# Patient Record
Sex: MALE | Race: WHITE | Employment: OTHER | URBAN - NONMETROPOLITAN AREA
[De-identification: names, ages, dates, MRNs, and addresses within clinical notes are randomized per-mention and may not be internally consistent; named-entity substitution may affect disease eponyms.]

---

## 2017-01-31 ENCOUNTER — TELEPHONE (OUTPATIENT)
Dept: PULMONOLOGY | Age: 52
End: 2017-01-31

## 2017-02-08 ENCOUNTER — TELEPHONE (OUTPATIENT)
Dept: CARDIOLOGY | Age: 52
End: 2017-02-08

## 2017-03-07 ENCOUNTER — PROCEDURE VISIT (OUTPATIENT)
Dept: CARDIOLOGY | Age: 52
End: 2017-03-07

## 2017-03-07 ENCOUNTER — OFFICE VISIT (OUTPATIENT)
Dept: CARDIOLOGY | Age: 52
End: 2017-03-07

## 2017-03-07 VITALS
HEART RATE: 88 BPM | WEIGHT: 160 LBS | HEIGHT: 68 IN | SYSTOLIC BLOOD PRESSURE: 116 MMHG | BODY MASS INDEX: 24.25 KG/M2 | DIASTOLIC BLOOD PRESSURE: 78 MMHG

## 2017-03-07 DIAGNOSIS — I50.22 CHF NYHA CLASS II, CHRONIC, SYSTOLIC (HCC): ICD-10-CM

## 2017-03-07 DIAGNOSIS — I50.22 CHRONIC SYSTOLIC CONGESTIVE HEART FAILURE (HCC): ICD-10-CM

## 2017-03-07 DIAGNOSIS — I51.9 LV DYSFUNCTION: ICD-10-CM

## 2017-03-07 DIAGNOSIS — I25.10 CORONARY ARTERY DISEASE INVOLVING NATIVE CORONARY ARTERY OF NATIVE HEART WITHOUT ANGINA PECTORIS: ICD-10-CM

## 2017-03-07 DIAGNOSIS — Q24.5 CORONARY ARTERY ANOMALY: ICD-10-CM

## 2017-03-07 DIAGNOSIS — I10 ESSENTIAL HYPERTENSION: ICD-10-CM

## 2017-03-07 DIAGNOSIS — R42 DIZZINESS AND GIDDINESS: ICD-10-CM

## 2017-03-07 DIAGNOSIS — Z95.810 ICD (IMPLANTABLE CARDIOVERTER-DEFIBRILLATOR) IN PLACE: Primary | ICD-10-CM

## 2017-03-07 DIAGNOSIS — R06.02 SHORTNESS OF BREATH: Primary | ICD-10-CM

## 2017-03-07 PROCEDURE — 99204 OFFICE O/P NEW MOD 45 MIN: CPT | Performed by: INTERNAL MEDICINE

## 2017-03-07 PROCEDURE — 93284 PRGRMG EVAL IMPLANTABLE DFB: CPT | Performed by: INTERNAL MEDICINE

## 2017-03-07 RX ORDER — LOSARTAN POTASSIUM 25 MG/1
25 TABLET ORAL DAILY
Qty: 30 TABLET | Refills: 0 | Status: SHIPPED | OUTPATIENT
Start: 2017-03-07 | End: 2017-04-08 | Stop reason: SDUPTHER

## 2017-03-07 RX ORDER — AMLODIPINE BESYLATE 5 MG/1
5 TABLET ORAL DAILY
Qty: 30 TABLET | Refills: 1 | Status: SHIPPED | OUTPATIENT
Start: 2017-03-07 | End: 2017-04-08 | Stop reason: SDUPTHER

## 2017-03-07 RX ORDER — SIMVASTATIN 20 MG
TABLET ORAL
Qty: 30 TABLET | Refills: 1 | Status: SHIPPED | OUTPATIENT
Start: 2017-03-07 | End: 2017-04-08 | Stop reason: SDUPTHER

## 2017-03-07 RX ORDER — CLONIDINE HYDROCHLORIDE 0.1 MG/1
0.1 TABLET ORAL 2 TIMES DAILY PRN
Qty: 60 TABLET | Refills: 1 | Status: SHIPPED | OUTPATIENT
Start: 2017-03-07 | End: 2017-04-08 | Stop reason: SDUPTHER

## 2017-03-07 RX ORDER — CARVEDILOL 25 MG/1
37.5 TABLET ORAL 2 TIMES DAILY WITH MEALS
Qty: 90 TABLET | Refills: 1 | Status: SHIPPED | OUTPATIENT
Start: 2017-03-07 | End: 2017-04-08 | Stop reason: SDUPTHER

## 2017-03-07 ASSESSMENT — ENCOUNTER SYMPTOMS
VOMITING: 0
ABDOMINAL PAIN: 0
SHORTNESS OF BREATH: 1
SORE THROAT: 0
LEFT EYE: 0
NAUSEA: 0
COUGH: 0
RIGHT EYE: 0
BACK PAIN: 0
DIARRHEA: 0
BLURRED VISION: 0
WHEEZING: 0
DOUBLE VISION: 0
CONSTIPATION: 0

## 2017-04-08 DIAGNOSIS — I50.22 CHF NYHA CLASS II, CHRONIC, SYSTOLIC (HCC): ICD-10-CM

## 2017-04-08 DIAGNOSIS — I51.9 LV DYSFUNCTION: ICD-10-CM

## 2017-04-08 DIAGNOSIS — I25.10 CORONARY ARTERY DISEASE INVOLVING NATIVE CORONARY ARTERY OF NATIVE HEART WITHOUT ANGINA PECTORIS: ICD-10-CM

## 2017-04-08 DIAGNOSIS — I10 ESSENTIAL HYPERTENSION: ICD-10-CM

## 2017-04-08 RX ORDER — GLIPIZIDE 5 MG/1
5 TABLET ORAL
Qty: 60 TABLET | Status: CANCELLED | OUTPATIENT
Start: 2017-04-08

## 2017-04-10 RX ORDER — SIMVASTATIN 20 MG
TABLET ORAL
Qty: 30 TABLET | Refills: 1 | Status: SHIPPED | OUTPATIENT
Start: 2017-04-10 | End: 2017-05-16 | Stop reason: SDUPTHER

## 2017-04-10 RX ORDER — AMLODIPINE BESYLATE 5 MG/1
5 TABLET ORAL DAILY
Qty: 30 TABLET | Refills: 1 | Status: SHIPPED | OUTPATIENT
Start: 2017-04-10 | End: 2017-05-16 | Stop reason: SDUPTHER

## 2017-04-10 RX ORDER — CLONIDINE HYDROCHLORIDE 0.1 MG/1
0.1 TABLET ORAL 2 TIMES DAILY PRN
Qty: 60 TABLET | Refills: 1 | Status: SHIPPED | OUTPATIENT
Start: 2017-04-10 | End: 2017-05-16 | Stop reason: SDUPTHER

## 2017-04-10 RX ORDER — CARVEDILOL 25 MG/1
37.5 TABLET ORAL 2 TIMES DAILY WITH MEALS
Qty: 90 TABLET | Refills: 1 | Status: SHIPPED | OUTPATIENT
Start: 2017-04-10 | End: 2017-05-16 | Stop reason: SDUPTHER

## 2017-04-10 RX ORDER — CLOPIDOGREL BISULFATE 75 MG/1
75 TABLET ORAL DAILY
Qty: 30 TABLET | Refills: 1 | Status: SHIPPED | OUTPATIENT
Start: 2017-04-10 | End: 2017-06-07 | Stop reason: SDUPTHER

## 2017-04-10 RX ORDER — ASPIRIN 81 MG/1
81 TABLET, CHEWABLE ORAL DAILY
Qty: 30 TABLET | Refills: 1 | Status: SHIPPED | OUTPATIENT
Start: 2017-04-10 | End: 2017-06-07 | Stop reason: SDUPTHER

## 2017-04-10 RX ORDER — LOSARTAN POTASSIUM 25 MG/1
25 TABLET ORAL DAILY
Qty: 30 TABLET | Refills: 1 | Status: SHIPPED | OUTPATIENT
Start: 2017-04-10 | End: 2017-05-30 | Stop reason: SDUPTHER

## 2017-05-15 PROBLEM — I50.22 CHRONIC SYSTOLIC HEART FAILURE (HCC): Chronic | Status: ACTIVE | Noted: 2017-05-15

## 2017-05-16 DIAGNOSIS — I10 ESSENTIAL HYPERTENSION: ICD-10-CM

## 2017-05-16 DIAGNOSIS — I50.22 CHF NYHA CLASS II, CHRONIC, SYSTOLIC (HCC): ICD-10-CM

## 2017-05-16 DIAGNOSIS — I51.9 LV DYSFUNCTION: ICD-10-CM

## 2017-05-16 DIAGNOSIS — I25.10 CORONARY ARTERY DISEASE INVOLVING NATIVE CORONARY ARTERY OF NATIVE HEART WITHOUT ANGINA PECTORIS: ICD-10-CM

## 2017-05-16 RX ORDER — AMLODIPINE BESYLATE 5 MG/1
TABLET ORAL
Qty: 30 TABLET | Refills: 0 | Status: SHIPPED | OUTPATIENT
Start: 2017-05-16 | End: 2017-06-07 | Stop reason: SDUPTHER

## 2017-05-16 RX ORDER — CLONIDINE HYDROCHLORIDE 0.1 MG/1
TABLET ORAL
Qty: 60 TABLET | Refills: 0 | Status: SHIPPED | OUTPATIENT
Start: 2017-05-16 | End: 2017-06-07 | Stop reason: SDUPTHER

## 2017-05-16 RX ORDER — CARVEDILOL 25 MG/1
TABLET ORAL
Qty: 45 TABLET | Refills: 0 | Status: SHIPPED | OUTPATIENT
Start: 2017-05-16 | End: 2017-06-07 | Stop reason: SDUPTHER

## 2017-05-16 RX ORDER — SIMVASTATIN 20 MG
TABLET ORAL
Qty: 30 TABLET | Refills: 0 | Status: SHIPPED | OUTPATIENT
Start: 2017-05-16 | End: 2017-06-07 | Stop reason: SDUPTHER

## 2017-05-18 ENCOUNTER — OFFICE VISIT (OUTPATIENT)
Dept: PULMONOLOGY | Age: 52
End: 2017-05-18

## 2017-05-18 VITALS
HEIGHT: 68 IN | OXYGEN SATURATION: 96 % | SYSTOLIC BLOOD PRESSURE: 107 MMHG | WEIGHT: 165.8 LBS | HEART RATE: 83 BPM | DIASTOLIC BLOOD PRESSURE: 64 MMHG | BODY MASS INDEX: 25.13 KG/M2 | TEMPERATURE: 97.1 F

## 2017-05-18 DIAGNOSIS — J96.11 CHRONIC RESPIRATORY FAILURE WITH HYPOXIA (HCC): Primary | ICD-10-CM

## 2017-05-18 DIAGNOSIS — G47.33 OSA (OBSTRUCTIVE SLEEP APNEA): ICD-10-CM

## 2017-05-18 DIAGNOSIS — J44.9 COPD, SEVERE (HCC): ICD-10-CM

## 2017-05-18 DIAGNOSIS — I50.22 CHRONIC SYSTOLIC CONGESTIVE HEART FAILURE (HCC): ICD-10-CM

## 2017-05-18 PROCEDURE — 94620 6 MIN WALK TEST: CPT | Performed by: INTERNAL MEDICINE

## 2017-05-18 PROCEDURE — 99204 OFFICE O/P NEW MOD 45 MIN: CPT | Performed by: INTERNAL MEDICINE

## 2017-05-18 RX ORDER — ALBUTEROL SULFATE 2.5 MG/3ML
2.5 SOLUTION RESPIRATORY (INHALATION) EVERY 6 HOURS PRN
COMMUNITY
End: 2017-08-07 | Stop reason: SDUPTHER

## 2017-05-18 RX ORDER — BUDESONIDE AND FORMOTEROL FUMARATE DIHYDRATE 160; 4.5 UG/1; UG/1
2 AEROSOL RESPIRATORY (INHALATION) 2 TIMES DAILY
Qty: 3 INHALER | Refills: 3 | Status: SHIPPED | OUTPATIENT
Start: 2017-05-18

## 2017-05-18 ASSESSMENT — ENCOUNTER SYMPTOMS
APNEA: 0
CHEST TIGHTNESS: 1
SHORTNESS OF BREATH: 1
ABDOMINAL DISTENTION: 1
COUGH: 1
SINUS PRESSURE: 0
FACIAL SWELLING: 0
WHEEZING: 1
BACK PAIN: 1
PHOTOPHOBIA: 0
BLOOD IN STOOL: 0
VOMITING: 0
RHINORRHEA: 0
ABDOMINAL PAIN: 1
CHOKING: 0
CONSTIPATION: 0
VOICE CHANGE: 0
STRIDOR: 0

## 2017-05-30 DIAGNOSIS — I50.22 CHF NYHA CLASS II, CHRONIC, SYSTOLIC (HCC): ICD-10-CM

## 2017-05-30 RX ORDER — LOSARTAN POTASSIUM 25 MG/1
TABLET ORAL
Qty: 30 TABLET | Refills: 1 | Status: SHIPPED | OUTPATIENT
Start: 2017-05-30 | End: 2017-06-07 | Stop reason: SDUPTHER

## 2017-06-05 ENCOUNTER — OFFICE VISIT (OUTPATIENT)
Dept: NEPHROLOGY | Age: 52
End: 2017-06-05

## 2017-06-05 VITALS
BODY MASS INDEX: 24.78 KG/M2 | WEIGHT: 163 LBS | DIASTOLIC BLOOD PRESSURE: 60 MMHG | RESPIRATION RATE: 16 BRPM | HEART RATE: 68 BPM | SYSTOLIC BLOOD PRESSURE: 102 MMHG

## 2017-06-05 DIAGNOSIS — E08.22 DIABETES MELLITUS DUE TO UNDERLYING CONDITION WITH STAGE 3 CHRONIC KIDNEY DISEASE, UNSPECIFIED LONG TERM INSULIN USE STATUS: ICD-10-CM

## 2017-06-05 DIAGNOSIS — N18.3 DIABETES MELLITUS DUE TO UNDERLYING CONDITION WITH STAGE 3 CHRONIC KIDNEY DISEASE, UNSPECIFIED LONG TERM INSULIN USE STATUS: ICD-10-CM

## 2017-06-05 DIAGNOSIS — I10 ESSENTIAL HYPERTENSION: ICD-10-CM

## 2017-06-05 DIAGNOSIS — N18.30 CKD (CHRONIC KIDNEY DISEASE) STAGE 3, GFR 30-59 ML/MIN (HCC): Primary | ICD-10-CM

## 2017-06-05 PROCEDURE — 99204 OFFICE O/P NEW MOD 45 MIN: CPT | Performed by: INTERNAL MEDICINE

## 2017-06-07 ENCOUNTER — OFFICE VISIT (OUTPATIENT)
Dept: CARDIOLOGY | Age: 52
End: 2017-06-07

## 2017-06-07 VITALS
BODY MASS INDEX: 25.46 KG/M2 | WEIGHT: 168 LBS | HEART RATE: 76 BPM | SYSTOLIC BLOOD PRESSURE: 120 MMHG | HEIGHT: 68 IN | DIASTOLIC BLOOD PRESSURE: 58 MMHG

## 2017-06-07 DIAGNOSIS — I10 ESSENTIAL HYPERTENSION: ICD-10-CM

## 2017-06-07 DIAGNOSIS — R06.02 SOB (SHORTNESS OF BREATH): Primary | ICD-10-CM

## 2017-06-07 DIAGNOSIS — I25.10 CORONARY ARTERY DISEASE INVOLVING NATIVE CORONARY ARTERY OF NATIVE HEART WITHOUT ANGINA PECTORIS: ICD-10-CM

## 2017-06-07 DIAGNOSIS — I50.22 CHF NYHA CLASS II, CHRONIC, SYSTOLIC (HCC): ICD-10-CM

## 2017-06-07 DIAGNOSIS — I51.9 LV DYSFUNCTION: ICD-10-CM

## 2017-06-07 PROCEDURE — 93000 ELECTROCARDIOGRAM COMPLETE: CPT | Performed by: INTERNAL MEDICINE

## 2017-06-07 PROCEDURE — 99213 OFFICE O/P EST LOW 20 MIN: CPT | Performed by: INTERNAL MEDICINE

## 2017-06-07 RX ORDER — LOSARTAN POTASSIUM 25 MG/1
25 TABLET ORAL DAILY
Qty: 30 TABLET | Refills: 6 | Status: SHIPPED | OUTPATIENT
Start: 2017-06-07 | End: 2017-12-16 | Stop reason: SDUPTHER

## 2017-06-07 RX ORDER — CLONIDINE HYDROCHLORIDE 0.1 MG/1
0.1 TABLET ORAL 2 TIMES DAILY PRN
Qty: 60 TABLET | Refills: 6 | Status: SHIPPED | OUTPATIENT
Start: 2017-06-07 | End: 2017-12-16 | Stop reason: SDUPTHER

## 2017-06-07 RX ORDER — ASPIRIN 81 MG/1
81 TABLET, CHEWABLE ORAL DAILY
Qty: 30 TABLET | Refills: 6 | Status: SHIPPED | OUTPATIENT
Start: 2017-06-07 | End: 2017-12-16 | Stop reason: SDUPTHER

## 2017-06-07 RX ORDER — CLOPIDOGREL BISULFATE 75 MG/1
75 TABLET ORAL DAILY
Qty: 30 TABLET | Refills: 6 | Status: SHIPPED | OUTPATIENT
Start: 2017-06-07 | End: 2017-12-16 | Stop reason: SDUPTHER

## 2017-06-07 RX ORDER — AMLODIPINE BESYLATE 5 MG/1
5 TABLET ORAL DAILY
Qty: 30 TABLET | Refills: 6 | Status: ON HOLD | OUTPATIENT
Start: 2017-06-07 | End: 2018-02-21 | Stop reason: HOSPADM

## 2017-06-07 RX ORDER — CARVEDILOL 25 MG/1
25 TABLET ORAL 2 TIMES DAILY
Qty: 60 TABLET | Refills: 6 | Status: SHIPPED | OUTPATIENT
Start: 2017-06-07 | End: 2017-12-16 | Stop reason: SDUPTHER

## 2017-06-07 RX ORDER — SIMVASTATIN 20 MG
20 TABLET ORAL NIGHTLY
Qty: 30 TABLET | Refills: 6 | Status: SHIPPED | OUTPATIENT
Start: 2017-06-07 | End: 2017-12-16 | Stop reason: SDUPTHER

## 2017-06-07 ASSESSMENT — ENCOUNTER SYMPTOMS
NAUSEA: 0
ABDOMINAL PAIN: 0
SORE THROAT: 0
LEFT EYE: 0
BLURRED VISION: 0
DOUBLE VISION: 0
WHEEZING: 0
COUGH: 0
VOMITING: 0
DIARRHEA: 0
CONSTIPATION: 0
SHORTNESS OF BREATH: 1
RIGHT EYE: 0
BACK PAIN: 0

## 2017-06-13 ENCOUNTER — PROCEDURE VISIT (OUTPATIENT)
Dept: CARDIOLOGY | Age: 52
End: 2017-06-13

## 2017-06-13 DIAGNOSIS — Z95.810 ICD (IMPLANTABLE CARDIOVERTER-DEFIBRILLATOR) IN PLACE: Primary | ICD-10-CM

## 2017-06-13 PROCEDURE — 93284 PRGRMG EVAL IMPLANTABLE DFB: CPT | Performed by: INTERNAL MEDICINE

## 2017-06-26 ENCOUNTER — INITIAL CONSULT (OUTPATIENT)
Dept: PULMONOLOGY | Age: 52
End: 2017-06-26

## 2017-06-26 VITALS
HEART RATE: 72 BPM | BODY MASS INDEX: 25.34 KG/M2 | DIASTOLIC BLOOD PRESSURE: 76 MMHG | WEIGHT: 167.2 LBS | SYSTOLIC BLOOD PRESSURE: 124 MMHG | HEIGHT: 68 IN | OXYGEN SATURATION: 97 %

## 2017-06-26 DIAGNOSIS — G47.33 OSA (OBSTRUCTIVE SLEEP APNEA): Primary | ICD-10-CM

## 2017-06-26 PROCEDURE — 99213 OFFICE O/P EST LOW 20 MIN: CPT | Performed by: INTERNAL MEDICINE

## 2017-07-22 ENCOUNTER — APPOINTMENT (OUTPATIENT)
Dept: GENERAL RADIOLOGY | Age: 52
End: 2017-07-22
Payer: COMMERCIAL

## 2017-07-22 ENCOUNTER — HOSPITAL ENCOUNTER (EMERGENCY)
Age: 52
Discharge: HOME | End: 2017-07-22
Payer: COMMERCIAL

## 2017-07-22 VITALS
TEMPERATURE: 98.4 F | HEART RATE: 84 BPM | SYSTOLIC BLOOD PRESSURE: 127 MMHG | BODY MASS INDEX: 25.16 KG/M2 | RESPIRATION RATE: 15 BRPM | DIASTOLIC BLOOD PRESSURE: 81 MMHG | OXYGEN SATURATION: 97 % | WEIGHT: 166 LBS | HEIGHT: 68 IN

## 2017-07-22 DIAGNOSIS — J44.1 COPD EXACERBATION (HCC): Primary | ICD-10-CM

## 2017-07-22 LAB
ALBUMIN SERPL-MCNC: 3.5 G/DL (ref 3.5–5.1)
ALP BLD-CCNC: 68 U/L (ref 38–126)
ALT SERPL-CCNC: 12 U/L (ref 11–66)
ANION GAP SERPL CALCULATED.3IONS-SCNC: 10 MEQ/L (ref 8–16)
AST SERPL-CCNC: 11 U/L (ref 5–40)
BASOPHILS # BLD: 0.5 %
BASOPHILS ABSOLUTE: 0.1 THOU/MM3 (ref 0–0.1)
BILIRUB SERPL-MCNC: 0.2 MG/DL (ref 0.3–1.2)
BILIRUBIN DIRECT: < 0.2 MG/DL (ref 0–0.3)
BUN BLDV-MCNC: 12 MG/DL (ref 7–22)
CALCIUM SERPL-MCNC: 9.1 MG/DL (ref 8.5–10.5)
CHLORIDE BLD-SCNC: 109 MEQ/L (ref 98–111)
CO2: 28 MEQ/L (ref 23–33)
CREAT SERPL-MCNC: 1.1 MG/DL (ref 0.4–1.2)
EOSINOPHIL # BLD: 22.8 %
EOSINOPHILS ABSOLUTE: 2.6 THOU/MM3 (ref 0–0.4)
GFR SERPL CREATININE-BSD FRML MDRD: 70 ML/MIN/1.73M2
GLUCOSE BLD-MCNC: 77 MG/DL (ref 70–108)
HCT VFR BLD CALC: 47.3 % (ref 42–52)
HEMOGLOBIN: 16 GM/DL (ref 14–18)
LIPASE: 47.8 U/L (ref 5.6–51.3)
LYMPHOCYTES # BLD: 13 %
LYMPHOCYTES ABSOLUTE: 1.5 THOU/MM3 (ref 1–4.8)
MAGNESIUM: 2 MG/DL (ref 1.6–2.4)
MCH RBC QN AUTO: 31 PG (ref 27–31)
MCHC RBC AUTO-ENTMCNC: 34 GM/DL (ref 33–37)
MCV RBC AUTO: 91.3 FL (ref 80–94)
MONOCYTES # BLD: 12.2 %
MONOCYTES ABSOLUTE: 1.4 THOU/MM3 (ref 0.4–1.3)
NUCLEATED RED BLOOD CELLS: 0 /100 WBC
OSMOLALITY CALCULATION: 291 MOSMOL/KG (ref 275–300)
PDW BLD-RTO: 14.3 % (ref 11.5–14.5)
PLATELET # BLD: 141 THOU/MM3 (ref 130–400)
PMV BLD AUTO: 12.3 MCM (ref 7.4–10.4)
POTASSIUM SERPL-SCNC: 3.9 MEQ/L (ref 3.5–5.2)
PRO-BNP: 64.1 PG/ML (ref 0–900)
RBC # BLD: 5.18 MILL/MM3 (ref 4.7–6.1)
RBC # BLD: NORMAL 10*6/UL
SEG NEUTROPHILS: 51.5 %
SEGMENTED NEUTROPHILS ABSOLUTE COUNT: 6 THOU/MM3 (ref 1.8–7.7)
SODIUM BLD-SCNC: 147 MEQ/L (ref 135–145)
TOTAL PROTEIN: 6.5 G/DL (ref 6.1–8)
TROPONIN T: < 0.01 NG/ML
WBC # BLD: 11.6 THOU/MM3 (ref 4.8–10.8)

## 2017-07-22 PROCEDURE — 83735 ASSAY OF MAGNESIUM: CPT

## 2017-07-22 PROCEDURE — 99285 EMERGENCY DEPT VISIT HI MDM: CPT

## 2017-07-22 PROCEDURE — 71010 XR CHEST PORTABLE: CPT

## 2017-07-22 PROCEDURE — 80053 COMPREHEN METABOLIC PANEL: CPT

## 2017-07-22 PROCEDURE — 96374 THER/PROPH/DIAG INJ IV PUSH: CPT

## 2017-07-22 PROCEDURE — 84484 ASSAY OF TROPONIN QUANT: CPT

## 2017-07-22 PROCEDURE — 36415 COLL VENOUS BLD VENIPUNCTURE: CPT

## 2017-07-22 PROCEDURE — 85025 COMPLETE CBC W/AUTO DIFF WBC: CPT

## 2017-07-22 PROCEDURE — 83880 ASSAY OF NATRIURETIC PEPTIDE: CPT

## 2017-07-22 PROCEDURE — 83690 ASSAY OF LIPASE: CPT

## 2017-07-22 PROCEDURE — 94640 AIRWAY INHALATION TREATMENT: CPT

## 2017-07-22 PROCEDURE — 82248 BILIRUBIN DIRECT: CPT

## 2017-07-22 PROCEDURE — 93005 ELECTROCARDIOGRAM TRACING: CPT

## 2017-07-22 PROCEDURE — 6370000000 HC RX 637 (ALT 250 FOR IP): Performed by: NURSE PRACTITIONER

## 2017-07-22 PROCEDURE — 6360000002 HC RX W HCPCS: Performed by: NURSE PRACTITIONER

## 2017-07-22 RX ORDER — AZITHROMYCIN 250 MG/1
TABLET, FILM COATED ORAL
Qty: 1 PACKET | Refills: 0 | Status: SHIPPED | OUTPATIENT
Start: 2017-07-22 | End: 2017-08-01

## 2017-07-22 RX ORDER — IPRATROPIUM BROMIDE AND ALBUTEROL SULFATE 2.5; .5 MG/3ML; MG/3ML
1 SOLUTION RESPIRATORY (INHALATION) ONCE
Status: COMPLETED | OUTPATIENT
Start: 2017-07-22 | End: 2017-07-22

## 2017-07-22 RX ORDER — METHYLPREDNISOLONE 4 MG/1
TABLET ORAL
Qty: 1 KIT | Refills: 0 | Status: SHIPPED | OUTPATIENT
Start: 2017-07-22 | End: 2017-07-28

## 2017-07-22 RX ORDER — METHYLPREDNISOLONE SODIUM SUCCINATE 125 MG/2ML
125 INJECTION, POWDER, LYOPHILIZED, FOR SOLUTION INTRAMUSCULAR; INTRAVENOUS ONCE
Status: COMPLETED | OUTPATIENT
Start: 2017-07-22 | End: 2017-07-22

## 2017-07-22 RX ADMIN — METHYLPREDNISOLONE SODIUM SUCCINATE 125 MG: 125 INJECTION, POWDER, FOR SOLUTION INTRAMUSCULAR; INTRAVENOUS at 20:51

## 2017-07-22 RX ADMIN — IPRATROPIUM BROMIDE AND ALBUTEROL SULFATE 1 AMPULE: .5; 3 SOLUTION RESPIRATORY (INHALATION) at 20:40

## 2017-07-22 ASSESSMENT — ENCOUNTER SYMPTOMS
ABDOMINAL PAIN: 0
APNEA: 0
NAUSEA: 0
SHORTNESS OF BREATH: 1
CHEST TIGHTNESS: 1
COUGH: 1
VOMITING: 0
CHOKING: 1
BACK PAIN: 0
COLOR CHANGE: 0

## 2017-07-22 ASSESSMENT — PAIN SCALES - GENERAL: PAINLEVEL_OUTOF10: 7

## 2017-07-22 ASSESSMENT — PAIN DESCRIPTION - LOCATION: LOCATION: CHEST

## 2017-07-22 ASSESSMENT — PAIN DESCRIPTION - PAIN TYPE: TYPE: ACUTE PAIN

## 2017-07-23 LAB
EKG ATRIAL RATE: 88 BPM
EKG P AXIS: 76 DEGREES
EKG P-R INTERVAL: 126 MS
EKG Q-T INTERVAL: 446 MS
EKG QRS DURATION: 162 MS
EKG QTC CALCULATION (BAZETT): 539 MS
EKG R AXIS: -122 DEGREES
EKG T AXIS: 79 DEGREES
EKG VENTRICULAR RATE: 88 BPM

## 2017-07-25 ENCOUNTER — TELEPHONE (OUTPATIENT)
Dept: CARDIOLOGY CLINIC | Age: 52
End: 2017-07-25

## 2017-07-26 ENCOUNTER — NURSE ONLY (OUTPATIENT)
Dept: CARDIOLOGY CLINIC | Age: 52
End: 2017-07-26

## 2017-07-26 DIAGNOSIS — Z95.810 ICD (IMPLANTABLE CARDIOVERTER-DEFIBRILLATOR) IN PLACE: Primary | ICD-10-CM

## 2017-08-05 ENCOUNTER — APPOINTMENT (OUTPATIENT)
Dept: INTERVENTIONAL RADIOLOGY/VASCULAR | Age: 52
End: 2017-08-05
Payer: COMMERCIAL

## 2017-08-05 ENCOUNTER — APPOINTMENT (OUTPATIENT)
Dept: GENERAL RADIOLOGY | Age: 52
End: 2017-08-05
Payer: COMMERCIAL

## 2017-08-05 ENCOUNTER — HOSPITAL ENCOUNTER (EMERGENCY)
Age: 52
Discharge: HOME | End: 2017-08-05
Attending: INTERNAL MEDICINE
Payer: COMMERCIAL

## 2017-08-05 VITALS
RESPIRATION RATE: 18 BRPM | OXYGEN SATURATION: 98 % | BODY MASS INDEX: 25.16 KG/M2 | SYSTOLIC BLOOD PRESSURE: 150 MMHG | WEIGHT: 166 LBS | HEART RATE: 90 BPM | DIASTOLIC BLOOD PRESSURE: 82 MMHG | HEIGHT: 68 IN | TEMPERATURE: 97.8 F

## 2017-08-05 DIAGNOSIS — M79.604 RIGHT LEG PAIN: ICD-10-CM

## 2017-08-05 DIAGNOSIS — J44.9 CHRONIC OBSTRUCTIVE PULMONARY DISEASE, UNSPECIFIED COPD TYPE (HCC): Primary | ICD-10-CM

## 2017-08-05 LAB
ALBUMIN SERPL-MCNC: 3.4 G/DL (ref 3.5–5.1)
ALLEN TEST: ABNORMAL
ALP BLD-CCNC: 65 U/L (ref 38–126)
ALT SERPL-CCNC: 15 U/L (ref 11–66)
ANION GAP SERPL CALCULATED.3IONS-SCNC: 9 MEQ/L (ref 8–16)
AST SERPL-CCNC: 13 U/L (ref 5–40)
BASE EXCESS (CALCULATED): 2.1 MMOL/L (ref -2.5–2.5)
BASOPHILS # BLD: 0.8 %
BASOPHILS ABSOLUTE: 0.1 THOU/MM3 (ref 0–0.1)
BILIRUB SERPL-MCNC: 0.2 MG/DL (ref 0.3–1.2)
BUN BLDV-MCNC: 20 MG/DL (ref 7–22)
CALCIUM SERPL-MCNC: 9.4 MG/DL (ref 8.5–10.5)
CHLORIDE BLD-SCNC: 106 MEQ/L (ref 98–111)
CO2: 31 MEQ/L (ref 23–33)
COLLECTED BY:: ABNORMAL
CREAT SERPL-MCNC: 1.2 MG/DL (ref 0.4–1.2)
DEVICE: ABNORMAL
EOSINOPHIL # BLD: 5.6 %
EOSINOPHILS ABSOLUTE: 0.8 THOU/MM3 (ref 0–0.4)
GFR SERPL CREATININE-BSD FRML MDRD: 63 ML/MIN/1.73M2
GLUCOSE BLD-MCNC: 52 MG/DL (ref 70–108)
HCO3: 29 MMOL/L (ref 23–28)
HCT VFR BLD CALC: 50.4 % (ref 42–52)
HEMOGLOBIN: 16.6 GM/DL (ref 14–18)
IFIO2: 3
LYMPHOCYTES # BLD: 10.5 %
LYMPHOCYTES ABSOLUTE: 1.4 THOU/MM3 (ref 1–4.8)
MCH RBC QN AUTO: 30.5 PG (ref 27–31)
MCHC RBC AUTO-ENTMCNC: 32.9 GM/DL (ref 33–37)
MCV RBC AUTO: 92.8 FL (ref 80–94)
MONOCYTES # BLD: 12.6 %
MONOCYTES ABSOLUTE: 1.7 THOU/MM3 (ref 0.4–1.3)
NUCLEATED RED BLOOD CELLS: 0 /100 WBC
O2 SATURATION: 97 %
OSMOLALITY CALCULATION: 290.6 MOSMOL/KG (ref 275–300)
PCO2: 51 MMHG (ref 35–45)
PDW BLD-RTO: 14.4 % (ref 11.5–14.5)
PH BLOOD GAS: 7.36 (ref 7.35–7.45)
PLATELET # BLD: 105 THOU/MM3 (ref 130–400)
PMV BLD AUTO: 11.2 MCM (ref 7.4–10.4)
PO2: 91 MMHG (ref 71–104)
POTASSIUM SERPL-SCNC: 4.2 MEQ/L (ref 3.5–5.2)
PRO-BNP: 49.7 PG/ML (ref 0–900)
RBC # BLD: 5.43 MILL/MM3 (ref 4.7–6.1)
RBC # BLD: NORMAL 10*6/UL
SEG NEUTROPHILS: 70.5 %
SEGMENTED NEUTROPHILS ABSOLUTE COUNT: 9.4 THOU/MM3 (ref 1.8–7.7)
SODIUM BLD-SCNC: 146 MEQ/L (ref 135–145)
SOURCE, BLOOD GAS: ABNORMAL
TOTAL PROTEIN: 6.2 G/DL (ref 6.1–8)
TROPONIN T: < 0.01 NG/ML
WBC # BLD: 13.4 THOU/MM3 (ref 4.8–10.8)

## 2017-08-05 PROCEDURE — 84484 ASSAY OF TROPONIN QUANT: CPT

## 2017-08-05 PROCEDURE — 71020 XR CHEST STANDARD TWO VW: CPT

## 2017-08-05 PROCEDURE — 83880 ASSAY OF NATRIURETIC PEPTIDE: CPT

## 2017-08-05 PROCEDURE — 93926 LOWER EXTREMITY STUDY: CPT

## 2017-08-05 PROCEDURE — 94640 AIRWAY INHALATION TREATMENT: CPT

## 2017-08-05 PROCEDURE — 99285 EMERGENCY DEPT VISIT HI MDM: CPT

## 2017-08-05 PROCEDURE — 80053 COMPREHEN METABOLIC PANEL: CPT

## 2017-08-05 PROCEDURE — 36600 WITHDRAWAL OF ARTERIAL BLOOD: CPT

## 2017-08-05 PROCEDURE — 85025 COMPLETE CBC W/AUTO DIFF WBC: CPT

## 2017-08-05 PROCEDURE — 36415 COLL VENOUS BLD VENIPUNCTURE: CPT

## 2017-08-05 PROCEDURE — 93005 ELECTROCARDIOGRAM TRACING: CPT

## 2017-08-05 PROCEDURE — 82803 BLOOD GASES ANY COMBINATION: CPT

## 2017-08-05 PROCEDURE — 6370000000 HC RX 637 (ALT 250 FOR IP): Performed by: INTERNAL MEDICINE

## 2017-08-05 RX ORDER — IPRATROPIUM BROMIDE AND ALBUTEROL SULFATE 2.5; .5 MG/3ML; MG/3ML
1 SOLUTION RESPIRATORY (INHALATION) ONCE
Status: COMPLETED | OUTPATIENT
Start: 2017-08-05 | End: 2017-08-05

## 2017-08-05 RX ADMIN — IPRATROPIUM BROMIDE AND ALBUTEROL SULFATE 1 AMPULE: .5; 3 SOLUTION RESPIRATORY (INHALATION) at 21:10

## 2017-08-05 ASSESSMENT — ENCOUNTER SYMPTOMS
RHINORRHEA: 0
ABDOMINAL PAIN: 0
SHORTNESS OF BREATH: 1
EYE REDNESS: 0
WHEEZING: 0
NAUSEA: 0
BACK PAIN: 0
SORE THROAT: 0
VOMITING: 0
COUGH: 0
DIARRHEA: 0
EYE DISCHARGE: 0

## 2017-08-05 ASSESSMENT — PAIN DESCRIPTION - LOCATION: LOCATION: LEG

## 2017-08-05 ASSESSMENT — PAIN DESCRIPTION - PAIN TYPE: TYPE: ACUTE PAIN

## 2017-08-05 ASSESSMENT — PAIN DESCRIPTION - ORIENTATION: ORIENTATION: LEFT;LOWER

## 2017-08-05 ASSESSMENT — PAIN SCALES - GENERAL: PAINLEVEL_OUTOF10: 6

## 2017-08-06 LAB
EKG ATRIAL RATE: 94 BPM
EKG P AXIS: 78 DEGREES
EKG P-R INTERVAL: 112 MS
EKG Q-T INTERVAL: 410 MS
EKG QRS DURATION: 154 MS
EKG QTC CALCULATION (BAZETT): 512 MS
EKG R AXIS: -113 DEGREES
EKG T AXIS: 84 DEGREES
EKG VENTRICULAR RATE: 94 BPM

## 2017-08-07 RX ORDER — ALBUTEROL SULFATE 2.5 MG/3ML
2.5 SOLUTION RESPIRATORY (INHALATION) EVERY 6 HOURS PRN
Qty: 120 EACH | Refills: 3 | Status: SHIPPED | OUTPATIENT
Start: 2017-08-07

## 2017-08-17 ENCOUNTER — OFFICE VISIT (OUTPATIENT)
Dept: PULMONOLOGY | Age: 52
End: 2017-08-17
Payer: COMMERCIAL

## 2017-08-17 VITALS
BODY MASS INDEX: 25.16 KG/M2 | TEMPERATURE: 98.4 F | HEART RATE: 85 BPM | DIASTOLIC BLOOD PRESSURE: 68 MMHG | SYSTOLIC BLOOD PRESSURE: 122 MMHG | HEIGHT: 68 IN | OXYGEN SATURATION: 95 % | WEIGHT: 166 LBS

## 2017-08-17 DIAGNOSIS — J96.11 CHRONIC RESPIRATORY FAILURE WITH HYPOXIA (HCC): ICD-10-CM

## 2017-08-17 DIAGNOSIS — J44.9 STAGE 4 VERY SEVERE COPD BY GOLD CLASSIFICATION (HCC): Primary | ICD-10-CM

## 2017-08-17 PROCEDURE — 99213 OFFICE O/P EST LOW 20 MIN: CPT | Performed by: INTERNAL MEDICINE

## 2017-08-22 ENCOUNTER — HOSPITAL ENCOUNTER (EMERGENCY)
Age: 52
Discharge: HOME | End: 2017-08-22
Attending: EMERGENCY MEDICINE
Payer: COMMERCIAL

## 2017-08-22 VITALS
HEART RATE: 77 BPM | BODY MASS INDEX: 25.01 KG/M2 | TEMPERATURE: 97.8 F | DIASTOLIC BLOOD PRESSURE: 80 MMHG | OXYGEN SATURATION: 95 % | RESPIRATION RATE: 18 BRPM | HEIGHT: 68 IN | WEIGHT: 165 LBS | SYSTOLIC BLOOD PRESSURE: 118 MMHG

## 2017-08-22 DIAGNOSIS — R42 DIZZINESS, NONSPECIFIC: Primary | ICD-10-CM

## 2017-08-22 LAB
ALBUMIN SERPL-MCNC: 3.5 G/DL (ref 3.5–5.1)
ALP BLD-CCNC: 68 U/L (ref 38–126)
ALT SERPL-CCNC: 10 U/L (ref 11–66)
ANION GAP SERPL CALCULATED.3IONS-SCNC: 13 MEQ/L (ref 8–16)
AST SERPL-CCNC: 12 U/L (ref 5–40)
BASOPHILS # BLD: 0.8 %
BASOPHILS ABSOLUTE: 0.1 THOU/MM3 (ref 0–0.1)
BILIRUB SERPL-MCNC: 0.2 MG/DL (ref 0.3–1.2)
BUN BLDV-MCNC: 16 MG/DL (ref 7–22)
CALCIUM SERPL-MCNC: 9.2 MG/DL (ref 8.5–10.5)
CHLORIDE BLD-SCNC: 107 MEQ/L (ref 98–111)
CO2: 25 MEQ/L (ref 23–33)
CREAT SERPL-MCNC: 1.3 MG/DL (ref 0.4–1.2)
EKG ATRIAL RATE: 93 BPM
EKG P AXIS: 85 DEGREES
EKG P-R INTERVAL: 130 MS
EKG Q-T INTERVAL: 412 MS
EKG QRS DURATION: 154 MS
EKG QTC CALCULATION (BAZETT): 512 MS
EKG R AXIS: -121 DEGREES
EKG T AXIS: 80 DEGREES
EKG VENTRICULAR RATE: 93 BPM
EOSINOPHIL # BLD: 7 %
EOSINOPHILS ABSOLUTE: 0.5 THOU/MM3 (ref 0–0.4)
GFR SERPL CREATININE-BSD FRML MDRD: 58 ML/MIN/1.73M2
GLUCOSE BLD-MCNC: 108 MG/DL (ref 70–108)
HCT VFR BLD CALC: 46.5 % (ref 42–52)
HEMOGLOBIN: 15.7 GM/DL (ref 14–18)
LYMPHOCYTES # BLD: 17.7 %
LYMPHOCYTES ABSOLUTE: 1.4 THOU/MM3 (ref 1–4.8)
MCH RBC QN AUTO: 31.2 PG (ref 27–31)
MCHC RBC AUTO-ENTMCNC: 33.8 GM/DL (ref 33–37)
MCV RBC AUTO: 92.2 FL (ref 80–94)
MONOCYTES # BLD: 17.1 %
MONOCYTES ABSOLUTE: 1.3 THOU/MM3 (ref 0.4–1.3)
NUCLEATED RED BLOOD CELLS: 0 /100 WBC
OSMOLALITY CALCULATION: 290.4 MOSMOL/KG (ref 275–300)
PDW BLD-RTO: 13.7 % (ref 11.5–14.5)
PLATELET # BLD: 182 THOU/MM3 (ref 130–400)
PMV BLD AUTO: 11.2 MCM (ref 7.4–10.4)
POTASSIUM SERPL-SCNC: 4.4 MEQ/L (ref 3.5–5.2)
RBC # BLD: 5.04 MILL/MM3 (ref 4.7–6.1)
RBC # BLD: NORMAL 10*6/UL
SEG NEUTROPHILS: 57.4 %
SEGMENTED NEUTROPHILS ABSOLUTE COUNT: 4.5 THOU/MM3 (ref 1.8–7.7)
SODIUM BLD-SCNC: 145 MEQ/L (ref 135–145)
TOTAL PROTEIN: 6.1 G/DL (ref 6.1–8)
TROPONIN T: < 0.01 NG/ML
WBC # BLD: 7.8 THOU/MM3 (ref 4.8–10.8)

## 2017-08-22 PROCEDURE — 93005 ELECTROCARDIOGRAM TRACING: CPT

## 2017-08-22 PROCEDURE — 84484 ASSAY OF TROPONIN QUANT: CPT

## 2017-08-22 PROCEDURE — 36415 COLL VENOUS BLD VENIPUNCTURE: CPT

## 2017-08-22 PROCEDURE — 85025 COMPLETE CBC W/AUTO DIFF WBC: CPT

## 2017-08-22 PROCEDURE — 6370000000 HC RX 637 (ALT 250 FOR IP): Performed by: EMERGENCY MEDICINE

## 2017-08-22 PROCEDURE — 99284 EMERGENCY DEPT VISIT MOD MDM: CPT

## 2017-08-22 PROCEDURE — 80053 COMPREHEN METABOLIC PANEL: CPT

## 2017-08-22 RX ORDER — MECLIZINE HYDROCHLORIDE CHEWABLE TABLETS 25 MG/1
25 TABLET, CHEWABLE ORAL ONCE
Status: COMPLETED | OUTPATIENT
Start: 2017-08-22 | End: 2017-08-22

## 2017-08-22 RX ORDER — MECLIZINE HYDROCHLORIDE CHEWABLE TABLETS 25 MG/1
25 TABLET, CHEWABLE ORAL 3 TIMES DAILY PRN
Qty: 12 TABLET | Refills: 0 | Status: SHIPPED | OUTPATIENT
Start: 2017-08-22 | End: 2018-02-08 | Stop reason: ALTCHOICE

## 2017-08-22 RX ADMIN — MECLIZINE HCL 25 MG: 25 TABLET, CHEWABLE ORAL at 12:05

## 2017-08-22 ASSESSMENT — PAIN SCALES - GENERAL: PAINLEVEL_OUTOF10: 6

## 2017-08-22 ASSESSMENT — PAIN DESCRIPTION - LOCATION: LOCATION: CHEST

## 2017-08-22 ASSESSMENT — PAIN DESCRIPTION - FREQUENCY: FREQUENCY: INTERMITTENT

## 2017-08-22 ASSESSMENT — PAIN DESCRIPTION - PAIN TYPE: TYPE: CHRONIC PAIN

## 2017-08-22 ASSESSMENT — PAIN DESCRIPTION - DESCRIPTORS: DESCRIPTORS: OTHER (COMMENT)

## 2017-08-27 ENCOUNTER — APPOINTMENT (OUTPATIENT)
Dept: GENERAL RADIOLOGY | Age: 52
End: 2017-08-27
Payer: COMMERCIAL

## 2017-08-27 ENCOUNTER — HOSPITAL ENCOUNTER (OUTPATIENT)
Age: 52
Setting detail: OBSERVATION
Discharge: HOME | End: 2017-08-28
Attending: FAMILY MEDICINE | Admitting: FAMILY MEDICINE
Payer: COMMERCIAL

## 2017-08-27 DIAGNOSIS — R07.9 CHEST PAIN, UNSPECIFIED TYPE: Primary | ICD-10-CM

## 2017-08-27 DIAGNOSIS — J44.9 CHRONIC OBSTRUCTIVE PULMONARY DISEASE, UNSPECIFIED COPD TYPE (HCC): ICD-10-CM

## 2017-08-27 DIAGNOSIS — I50.22 CHRONIC SYSTOLIC CONGESTIVE HEART FAILURE (HCC): ICD-10-CM

## 2017-08-27 LAB
ALLEN TEST: ABNORMAL
ANION GAP SERPL CALCULATED.3IONS-SCNC: 11 MEQ/L (ref 8–16)
BASE EXCESS (CALCULATED): 1.6 MMOL/L (ref -2.5–2.5)
BASOPHILS # BLD: 0.5 %
BASOPHILS ABSOLUTE: 0 THOU/MM3 (ref 0–0.1)
BUN BLDV-MCNC: 14 MG/DL (ref 7–22)
CALCIUM SERPL-MCNC: 9.5 MG/DL (ref 8.5–10.5)
CHLORIDE BLD-SCNC: 107 MEQ/L (ref 98–111)
CO2: 28 MEQ/L (ref 23–33)
COLLECTED BY:: ABNORMAL
CREAT SERPL-MCNC: 1.3 MG/DL (ref 0.4–1.2)
D-DIMER QUANTITATIVE: 368 NG/ML FEU (ref 0–500)
DEVICE: ABNORMAL
EOSINOPHIL # BLD: 6.2 %
EOSINOPHILS ABSOLUTE: 0.5 THOU/MM3 (ref 0–0.4)
FLU A ANTIGEN: NEGATIVE
FLU B ANTIGEN: NEGATIVE
GFR SERPL CREATININE-BSD FRML MDRD: 58 ML/MIN/1.73M2
GLUCOSE BLD-MCNC: 93 MG/DL (ref 70–108)
HCO3: 28 MMOL/L (ref 23–28)
HCT VFR BLD CALC: 48 % (ref 42–52)
HEMOGLOBIN: 15.9 GM/DL (ref 14–18)
IFIO2: 2
LYMPHOCYTES # BLD: 16.7 %
LYMPHOCYTES ABSOLUTE: 1.3 THOU/MM3 (ref 1–4.8)
MCH RBC QN AUTO: 30.3 PG (ref 27–31)
MCHC RBC AUTO-ENTMCNC: 33.1 GM/DL (ref 33–37)
MCV RBC AUTO: 91.4 FL (ref 80–94)
MONOCYTES # BLD: 13.6 %
MONOCYTES ABSOLUTE: 1.1 THOU/MM3 (ref 0.4–1.3)
NUCLEATED RED BLOOD CELLS: 0 /100 WBC
O2 SATURATION: 95 %
OSMOLALITY CALCULATION: 290.7 MOSMOL/KG (ref 275–300)
PCO2: 47 MMHG (ref 35–45)
PDW BLD-RTO: 14.3 % (ref 11.5–14.5)
PH BLOOD GAS: 7.38 (ref 7.35–7.45)
PLATELET # BLD: 173 THOU/MM3 (ref 130–400)
PMV BLD AUTO: 11.8 MCM (ref 7.4–10.4)
PO2: 79 MMHG (ref 71–104)
POTASSIUM SERPL-SCNC: 4.6 MEQ/L (ref 3.5–5.2)
PRO-BNP: 21.3 PG/ML (ref 0–900)
RBC # BLD: 5.26 MILL/MM3 (ref 4.7–6.1)
RBC # BLD: NORMAL 10*6/UL
SEG NEUTROPHILS: 63 %
SEGMENTED NEUTROPHILS ABSOLUTE COUNT: 5 THOU/MM3 (ref 1.8–7.7)
SODIUM BLD-SCNC: 146 MEQ/L (ref 135–145)
SOURCE, BLOOD GAS: ABNORMAL
TROPONIN T: < 0.01 NG/ML
TROPONIN T: < 0.01 NG/ML
WBC # BLD: 7.9 THOU/MM3 (ref 4.8–10.8)

## 2017-08-27 PROCEDURE — 80048 BASIC METABOLIC PNL TOTAL CA: CPT

## 2017-08-27 PROCEDURE — 6370000000 HC RX 637 (ALT 250 FOR IP): Performed by: FAMILY MEDICINE

## 2017-08-27 PROCEDURE — 83880 ASSAY OF NATRIURETIC PEPTIDE: CPT

## 2017-08-27 PROCEDURE — 99285 EMERGENCY DEPT VISIT HI MDM: CPT

## 2017-08-27 PROCEDURE — 93005 ELECTROCARDIOGRAM TRACING: CPT

## 2017-08-27 PROCEDURE — 87804 INFLUENZA ASSAY W/OPTIC: CPT

## 2017-08-27 PROCEDURE — 82803 BLOOD GASES ANY COMBINATION: CPT

## 2017-08-27 PROCEDURE — 36600 WITHDRAWAL OF ARTERIAL BLOOD: CPT

## 2017-08-27 PROCEDURE — 94640 AIRWAY INHALATION TREATMENT: CPT

## 2017-08-27 PROCEDURE — 71010 XR CHEST PORTABLE: CPT

## 2017-08-27 PROCEDURE — 85379 FIBRIN DEGRADATION QUANT: CPT

## 2017-08-27 PROCEDURE — 36415 COLL VENOUS BLD VENIPUNCTURE: CPT

## 2017-08-27 PROCEDURE — 84484 ASSAY OF TROPONIN QUANT: CPT

## 2017-08-27 PROCEDURE — 85025 COMPLETE CBC W/AUTO DIFF WBC: CPT

## 2017-08-27 RX ORDER — IPRATROPIUM BROMIDE AND ALBUTEROL SULFATE 2.5; .5 MG/3ML; MG/3ML
1 SOLUTION RESPIRATORY (INHALATION) ONCE
Status: COMPLETED | OUTPATIENT
Start: 2017-08-27 | End: 2017-08-27

## 2017-08-27 RX ORDER — ASPIRIN 81 MG/1
324 TABLET, CHEWABLE ORAL ONCE
Status: COMPLETED | OUTPATIENT
Start: 2017-08-27 | End: 2017-08-27

## 2017-08-27 RX ADMIN — IPRATROPIUM BROMIDE AND ALBUTEROL SULFATE 1 AMPULE: .5; 3 SOLUTION RESPIRATORY (INHALATION) at 20:24

## 2017-08-27 RX ADMIN — ASPIRIN 81 MG 324 MG: 81 TABLET ORAL at 20:53

## 2017-08-27 ASSESSMENT — ENCOUNTER SYMPTOMS
EYE DISCHARGE: 0
COUGH: 1
RHINORRHEA: 0
VOMITING: 0
SORE THROAT: 0
ABDOMINAL PAIN: 0
WHEEZING: 0
EYE REDNESS: 0
SHORTNESS OF BREATH: 1
NAUSEA: 0
DIARRHEA: 0
BACK PAIN: 0
CONSTIPATION: 0

## 2017-08-27 ASSESSMENT — PAIN DESCRIPTION - PAIN TYPE: TYPE: ACUTE PAIN

## 2017-08-27 ASSESSMENT — PAIN DESCRIPTION - DESCRIPTORS: DESCRIPTORS: TIGHTNESS

## 2017-08-27 ASSESSMENT — HEART SCORE: ECG: 1

## 2017-08-27 ASSESSMENT — PAIN SCALES - GENERAL: PAINLEVEL_OUTOF10: 6

## 2017-08-27 ASSESSMENT — PAIN DESCRIPTION - ONSET: ONSET: ON-GOING

## 2017-08-27 ASSESSMENT — PAIN DESCRIPTION - LOCATION: LOCATION: CHEST

## 2017-08-28 VITALS
WEIGHT: 163 LBS | DIASTOLIC BLOOD PRESSURE: 74 MMHG | HEIGHT: 68 IN | RESPIRATION RATE: 18 BRPM | SYSTOLIC BLOOD PRESSURE: 122 MMHG | TEMPERATURE: 97.4 F | OXYGEN SATURATION: 96 % | BODY MASS INDEX: 24.71 KG/M2 | HEART RATE: 71 BPM

## 2017-08-28 PROBLEM — E87.0 HYPERNATREMIA: Status: ACTIVE | Noted: 2017-08-28

## 2017-08-28 LAB
AMPHETAMINE+METHAMPHETAMINE URINE SCREEN: NEGATIVE
AVERAGE GLUCOSE: 120 MG/DL (ref 70–126)
BARBITURATE QUANTITATIVE URINE: NEGATIVE
BENZODIAZEPINE QUANTITATIVE URINE: NEGATIVE
BILIRUBIN URINE: NEGATIVE
BLOOD, URINE: NEGATIVE
C-REACTIVE PROTEIN: 0.1 MG/DL (ref 0–1)
CANNABINOID QUANTITATIVE URINE: NEGATIVE
CHARACTER, URINE: CLEAR
COCAINE METABOLITE QUANTITATIVE URINE: NEGATIVE
COLOR: YELLOW
GLUCOSE BLD-MCNC: 105 MG/DL (ref 70–108)
GLUCOSE BLD-MCNC: 136 MG/DL (ref 70–108)
GLUCOSE URINE: NEGATIVE MG/DL
HBA1C MFR BLD: 6 % (ref 4.4–6.4)
KETONES, URINE: NEGATIVE
LEUKOCYTE ESTERASE, URINE: NEGATIVE
NITRITE, URINE: NEGATIVE
OPIATES, URINE: NEGATIVE
OXYCODONE: NEGATIVE
PH UA: 5.5
PHENCYCLIDINE QUANTITATIVE URINE: NEGATIVE
PROCALCITONIN: < 0.02 NG/ML (ref 0.01–0.09)
PROTEIN UA: NEGATIVE
SEDIMENTATION RATE, ERYTHROCYTE: 2 MM/HR (ref 0–10)
SPECIFIC GRAVITY, URINE: 1.03 (ref 1–1.03)
TROPONIN T: < 0.01 NG/ML
TROPONIN T: < 0.01 NG/ML
UROBILINOGEN, URINE: 1 EU/DL

## 2017-08-28 PROCEDURE — 83036 HEMOGLOBIN GLYCOSYLATED A1C: CPT

## 2017-08-28 PROCEDURE — 36415 COLL VENOUS BLD VENIPUNCTURE: CPT

## 2017-08-28 PROCEDURE — 86140 C-REACTIVE PROTEIN: CPT

## 2017-08-28 PROCEDURE — G0378 HOSPITAL OBSERVATION PER HR: HCPCS

## 2017-08-28 PROCEDURE — 99225 PR SBSQ OBSERVATION CARE/DAY 25 MINUTES: CPT | Performed by: NURSE PRACTITIONER

## 2017-08-28 PROCEDURE — 82948 REAGENT STRIP/BLOOD GLUCOSE: CPT

## 2017-08-28 PROCEDURE — 84145 PROCALCITONIN (PCT): CPT

## 2017-08-28 PROCEDURE — 6370000000 HC RX 637 (ALT 250 FOR IP): Performed by: FAMILY MEDICINE

## 2017-08-28 PROCEDURE — 81003 URINALYSIS AUTO W/O SCOPE: CPT

## 2017-08-28 PROCEDURE — 99222 1ST HOSP IP/OBS MODERATE 55: CPT | Performed by: FAMILY MEDICINE

## 2017-08-28 PROCEDURE — 80307 DRUG TEST PRSMV CHEM ANLYZR: CPT

## 2017-08-28 PROCEDURE — 84484 ASSAY OF TROPONIN QUANT: CPT

## 2017-08-28 PROCEDURE — 99214 OFFICE O/P EST MOD 30 MIN: CPT | Performed by: INTERNAL MEDICINE

## 2017-08-28 PROCEDURE — 85651 RBC SED RATE NONAUTOMATED: CPT

## 2017-08-28 RX ORDER — SIMVASTATIN 20 MG
20 TABLET ORAL EVERY EVENING
Status: DISCONTINUED | OUTPATIENT
Start: 2017-08-28 | End: 2017-08-28 | Stop reason: HOSPADM

## 2017-08-28 RX ORDER — ACETAMINOPHEN 325 MG/1
650 TABLET ORAL EVERY 4 HOURS PRN
Status: DISCONTINUED | OUTPATIENT
Start: 2017-08-28 | End: 2017-08-28 | Stop reason: HOSPADM

## 2017-08-28 RX ORDER — HYDROCODONE BITARTRATE AND ACETAMINOPHEN 5; 325 MG/1; MG/1
1 TABLET ORAL EVERY 6 HOURS PRN
Status: DISCONTINUED | OUTPATIENT
Start: 2017-08-28 | End: 2017-08-28 | Stop reason: HOSPADM

## 2017-08-28 RX ORDER — CARVEDILOL 25 MG/1
25 TABLET ORAL 2 TIMES DAILY
Status: DISCONTINUED | OUTPATIENT
Start: 2017-08-28 | End: 2017-08-28 | Stop reason: HOSPADM

## 2017-08-28 RX ORDER — NICOTINE POLACRILEX 4 MG
15 LOZENGE BUCCAL PRN
Status: DISCONTINUED | OUTPATIENT
Start: 2017-08-28 | End: 2017-08-28 | Stop reason: HOSPADM

## 2017-08-28 RX ORDER — FAMOTIDINE 20 MG/1
20 TABLET, FILM COATED ORAL 2 TIMES DAILY
Status: DISCONTINUED | OUTPATIENT
Start: 2017-08-28 | End: 2017-08-28 | Stop reason: HOSPADM

## 2017-08-28 RX ORDER — CLONIDINE HYDROCHLORIDE 0.1 MG/1
0.1 TABLET ORAL 2 TIMES DAILY PRN
Status: DISCONTINUED | OUTPATIENT
Start: 2017-08-28 | End: 2017-08-28 | Stop reason: HOSPADM

## 2017-08-28 RX ORDER — GLIPIZIDE 5 MG/1
5 TABLET ORAL
Status: DISCONTINUED | OUTPATIENT
Start: 2017-08-28 | End: 2017-08-28 | Stop reason: HOSPADM

## 2017-08-28 RX ORDER — SODIUM CHLORIDE 0.9 % (FLUSH) 0.9 %
10 SYRINGE (ML) INJECTION PRN
Status: DISCONTINUED | OUTPATIENT
Start: 2017-08-28 | End: 2017-08-28 | Stop reason: HOSPADM

## 2017-08-28 RX ORDER — HYDROCODONE BITARTRATE AND ACETAMINOPHEN 5; 325 MG/1; MG/1
1 TABLET ORAL EVERY 6 HOURS PRN
Qty: 10 TABLET | Refills: 0 | Status: SHIPPED | OUTPATIENT
Start: 2017-08-28 | End: 2018-02-08 | Stop reason: ALTCHOICE

## 2017-08-28 RX ORDER — LOSARTAN POTASSIUM 25 MG/1
25 TABLET ORAL DAILY
Status: DISCONTINUED | OUTPATIENT
Start: 2017-08-28 | End: 2017-08-28 | Stop reason: HOSPADM

## 2017-08-28 RX ORDER — DEXTROSE MONOHYDRATE 50 MG/ML
100 INJECTION, SOLUTION INTRAVENOUS PRN
Status: DISCONTINUED | OUTPATIENT
Start: 2017-08-28 | End: 2017-08-28 | Stop reason: HOSPADM

## 2017-08-28 RX ORDER — AMLODIPINE BESYLATE 5 MG/1
5 TABLET ORAL DAILY
Status: DISCONTINUED | OUTPATIENT
Start: 2017-08-28 | End: 2017-08-28 | Stop reason: HOSPADM

## 2017-08-28 RX ORDER — CLOPIDOGREL BISULFATE 75 MG/1
75 TABLET ORAL DAILY
Status: DISCONTINUED | OUTPATIENT
Start: 2017-08-28 | End: 2017-08-28 | Stop reason: HOSPADM

## 2017-08-28 RX ORDER — SODIUM CHLORIDE 0.9 % (FLUSH) 0.9 %
10 SYRINGE (ML) INJECTION EVERY 12 HOURS SCHEDULED
Status: DISCONTINUED | OUTPATIENT
Start: 2017-08-28 | End: 2017-08-28 | Stop reason: HOSPADM

## 2017-08-28 RX ORDER — ONDANSETRON 2 MG/ML
4 INJECTION INTRAMUSCULAR; INTRAVENOUS EVERY 6 HOURS PRN
Status: DISCONTINUED | OUTPATIENT
Start: 2017-08-28 | End: 2017-08-28 | Stop reason: HOSPADM

## 2017-08-28 RX ORDER — ASPIRIN 81 MG/1
81 TABLET, CHEWABLE ORAL DAILY
Status: DISCONTINUED | OUTPATIENT
Start: 2017-08-28 | End: 2017-08-28 | Stop reason: HOSPADM

## 2017-08-28 RX ORDER — BUDESONIDE AND FORMOTEROL FUMARATE DIHYDRATE 160; 4.5 UG/1; UG/1
2 AEROSOL RESPIRATORY (INHALATION) 2 TIMES DAILY
Status: DISCONTINUED | OUTPATIENT
Start: 2017-08-28 | End: 2017-08-28 | Stop reason: CLARIF

## 2017-08-28 RX ORDER — DEXTROSE MONOHYDRATE 25 G/50ML
12.5 INJECTION, SOLUTION INTRAVENOUS PRN
Status: DISCONTINUED | OUTPATIENT
Start: 2017-08-28 | End: 2017-08-28 | Stop reason: HOSPADM

## 2017-08-28 RX ORDER — ALBUTEROL SULFATE 2.5 MG/3ML
2.5 SOLUTION RESPIRATORY (INHALATION) EVERY 6 HOURS PRN
Status: DISCONTINUED | OUTPATIENT
Start: 2017-08-28 | End: 2017-08-28 | Stop reason: HOSPADM

## 2017-08-28 RX ADMIN — GLIPIZIDE 5 MG: 5 TABLET ORAL at 08:13

## 2017-08-28 RX ADMIN — LOSARTAN POTASSIUM 25 MG: 25 TABLET, FILM COATED ORAL at 08:13

## 2017-08-28 RX ADMIN — CARVEDILOL 25 MG: 25 TABLET, FILM COATED ORAL at 08:13

## 2017-08-28 RX ADMIN — ASPIRIN 81 MG: 81 TABLET, CHEWABLE ORAL at 08:14

## 2017-08-28 RX ADMIN — ACETAMINOPHEN 650 MG: 325 TABLET ORAL at 02:19

## 2017-08-28 RX ADMIN — CLOPIDOGREL 75 MG: 75 TABLET, FILM COATED ORAL at 08:14

## 2017-08-28 ASSESSMENT — PAIN DESCRIPTION - FREQUENCY
FREQUENCY: CONTINUOUS
FREQUENCY: CONTINUOUS

## 2017-08-28 ASSESSMENT — PAIN DESCRIPTION - PAIN TYPE
TYPE: ACUTE PAIN
TYPE_2: CHRONIC PAIN

## 2017-08-28 ASSESSMENT — PAIN DESCRIPTION - ORIENTATION
ORIENTATION: LEFT;UPPER
ORIENTATION: LEFT;UPPER
ORIENTATION_2: LEFT

## 2017-08-28 ASSESSMENT — PAIN SCALES - GENERAL
PAINLEVEL_OUTOF10: 5
PAINLEVEL_OUTOF10: 4
PAINLEVEL_OUTOF10: 6

## 2017-08-28 ASSESSMENT — PAIN DESCRIPTION - ONSET
ONSET: ON-GOING
ONSET: ON-GOING

## 2017-08-28 ASSESSMENT — PAIN DESCRIPTION - LOCATION
LOCATION: CHEST
LOCATION: CHEST
LOCATION: ARM
LOCATION_2: SHOULDER

## 2017-08-28 ASSESSMENT — PAIN DESCRIPTION - INTENSITY: RATING_2: 4

## 2017-08-29 LAB
EKG ATRIAL RATE: 89 BPM
EKG P AXIS: 86 DEGREES
EKG P-R INTERVAL: 158 MS
EKG Q-T INTERVAL: 388 MS
EKG QRS DURATION: 126 MS
EKG QTC CALCULATION (BAZETT): 472 MS
EKG R AXIS: -101 DEGREES
EKG T AXIS: 68 DEGREES
EKG VENTRICULAR RATE: 89 BPM

## 2017-08-30 ENCOUNTER — TELEPHONE (OUTPATIENT)
Dept: CARDIOLOGY CLINIC | Age: 52
End: 2017-08-30

## 2017-08-31 ENCOUNTER — TELEPHONE (OUTPATIENT)
Dept: CARDIOLOGY CLINIC | Age: 52
End: 2017-08-31

## 2017-09-05 ENCOUNTER — HOSPITAL ENCOUNTER (OUTPATIENT)
Dept: INTERVENTIONAL RADIOLOGY/VASCULAR | Age: 52
Discharge: HOME | End: 2017-09-05
Payer: COMMERCIAL

## 2017-09-05 DIAGNOSIS — M79.662 PAIN IN LEFT LOWER LEG: ICD-10-CM

## 2017-09-05 PROCEDURE — 93971 EXTREMITY STUDY: CPT

## 2017-09-13 ENCOUNTER — TELEPHONE (OUTPATIENT)
Dept: CARDIOLOGY CLINIC | Age: 52
End: 2017-09-13

## 2017-09-21 ENCOUNTER — TELEPHONE (OUTPATIENT)
Dept: CARDIOLOGY CLINIC | Age: 52
End: 2017-09-21

## 2017-09-21 ENCOUNTER — TELEPHONE (OUTPATIENT)
Dept: PULMONOLOGY | Age: 52
End: 2017-09-21

## 2017-10-17 ENCOUNTER — PROCEDURE VISIT (OUTPATIENT)
Dept: CARDIOLOGY CLINIC | Age: 52
End: 2017-10-17
Payer: COMMERCIAL

## 2017-10-17 ENCOUNTER — OFFICE VISIT (OUTPATIENT)
Dept: CARDIOLOGY CLINIC | Age: 52
End: 2017-10-17
Payer: COMMERCIAL

## 2017-10-17 VITALS
SYSTOLIC BLOOD PRESSURE: 129 MMHG | HEIGHT: 68 IN | HEART RATE: 88 BPM | WEIGHT: 169 LBS | BODY MASS INDEX: 25.61 KG/M2 | DIASTOLIC BLOOD PRESSURE: 88 MMHG

## 2017-10-17 DIAGNOSIS — Z95.810 ICD (IMPLANTABLE CARDIOVERTER-DEFIBRILLATOR) IN PLACE: Primary | ICD-10-CM

## 2017-10-17 PROCEDURE — 99213 OFFICE O/P EST LOW 20 MIN: CPT | Performed by: INTERNAL MEDICINE

## 2017-10-17 PROCEDURE — 93284 PRGRMG EVAL IMPLANTABLE DFB: CPT | Performed by: INTERNAL MEDICINE

## 2017-10-17 ASSESSMENT — ENCOUNTER SYMPTOMS
LEFT EYE: 0
DIARRHEA: 0
ABDOMINAL PAIN: 0
BACK PAIN: 0
SHORTNESS OF BREATH: 0
NAUSEA: 0
DOUBLE VISION: 0
VOMITING: 0
BLURRED VISION: 0
SORE THROAT: 0
COUGH: 0
CONSTIPATION: 0
RIGHT EYE: 0
WHEEZING: 0
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
## 2017-10-26 ENCOUNTER — TELEPHONE (OUTPATIENT)
Dept: PULMONOLOGY | Age: 52
End: 2017-10-26

## 2017-10-26 ENCOUNTER — OFFICE VISIT (OUTPATIENT)
Dept: PULMONOLOGY | Age: 52
End: 2017-10-26
Payer: COMMERCIAL

## 2017-10-26 VITALS
DIASTOLIC BLOOD PRESSURE: 78 MMHG | HEIGHT: 68 IN | WEIGHT: 166 LBS | HEART RATE: 85 BPM | SYSTOLIC BLOOD PRESSURE: 118 MMHG | BODY MASS INDEX: 25.16 KG/M2 | OXYGEN SATURATION: 95 %

## 2017-10-26 DIAGNOSIS — J43.2 CENTRILOBULAR EMPHYSEMA (HCC): ICD-10-CM

## 2017-10-26 DIAGNOSIS — J96.11 CHRONIC RESPIRATORY FAILURE WITH HYPOXIA (HCC): ICD-10-CM

## 2017-10-26 DIAGNOSIS — G47.33 OBSTRUCTIVE SLEEP APNEA OF ADULT: Primary | ICD-10-CM

## 2017-10-26 DIAGNOSIS — Z91.199 NON-COMPLIANCE: ICD-10-CM

## 2017-10-26 PROCEDURE — 3017F COLORECTAL CA SCREEN DOC REV: CPT | Performed by: PHYSICIAN ASSISTANT

## 2017-10-26 PROCEDURE — 99215 OFFICE O/P EST HI 40 MIN: CPT | Performed by: PHYSICIAN ASSISTANT

## 2017-10-26 PROCEDURE — G8926 SPIRO NO PERF OR DOC: HCPCS | Performed by: PHYSICIAN ASSISTANT

## 2017-10-26 PROCEDURE — G8427 DOCREV CUR MEDS BY ELIG CLIN: HCPCS | Performed by: PHYSICIAN ASSISTANT

## 2017-10-26 PROCEDURE — 1036F TOBACCO NON-USER: CPT | Performed by: PHYSICIAN ASSISTANT

## 2017-10-26 PROCEDURE — G8598 ASA/ANTIPLAT THER USED: HCPCS | Performed by: PHYSICIAN ASSISTANT

## 2017-10-26 PROCEDURE — 3023F SPIROM DOC REV: CPT | Performed by: PHYSICIAN ASSISTANT

## 2017-10-26 PROCEDURE — G8484 FLU IMMUNIZE NO ADMIN: HCPCS | Performed by: PHYSICIAN ASSISTANT

## 2017-10-26 PROCEDURE — 94620 6 MIN WALK TEST: CPT | Performed by: PHYSICIAN ASSISTANT

## 2017-10-26 PROCEDURE — G8417 CALC BMI ABV UP PARAM F/U: HCPCS | Performed by: PHYSICIAN ASSISTANT

## 2017-10-26 RX ORDER — ALBUTEROL SULFATE 90 UG/1
2 AEROSOL, METERED RESPIRATORY (INHALATION) EVERY 6 HOURS PRN
Qty: 1 INHALER | Refills: 3 | Status: SHIPPED | OUTPATIENT
Start: 2017-10-26 | End: 2018-03-08 | Stop reason: SDUPTHER

## 2017-10-26 NOTE — TELEPHONE ENCOUNTER
Chris Bernstein from [facility] called stating they are refusing to give patient his oxygen because he moves around too much. She stated he needs to go with someone nationally.   I will fax all records to [facility]

## 2017-10-26 NOTE — PATIENT INSTRUCTIONS
Health Maintenance Due   Topic Date Due    Diabetic foot exam  12/02/1974    Diabetic retinal exam  12/02/1974    HIV screen  12/02/1979    DTaP/Tdap/Td vaccine (1 - Tdap) 12/02/1983    Lipid screen  12/25/2013    Colon cancer screen colonoscopy  12/02/2014    Flu vaccine (1) 09/01/2017
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
## 2017-10-30 ENCOUNTER — TELEPHONE (OUTPATIENT)
Dept: PULMONOLOGY | Age: 52
End: 2017-10-30

## 2017-10-30 NOTE — TELEPHONE ENCOUNTER
Kt from [id number] called to notify you that they are going to take over [facility] [employer]. Initially they turned him down, but now decision was made to take over.

## 2017-12-16 DIAGNOSIS — I25.10 CORONARY ARTERY DISEASE INVOLVING NATIVE CORONARY ARTERY OF NATIVE HEART WITHOUT ANGINA PECTORIS: ICD-10-CM

## 2017-12-16 DIAGNOSIS — I50.22 CHF NYHA CLASS II, CHRONIC, SYSTOLIC (HCC): ICD-10-CM

## 2017-12-16 DIAGNOSIS — I10 ESSENTIAL HYPERTENSION: ICD-10-CM

## 2017-12-18 RX ORDER — LOSARTAN POTASSIUM 25 MG/1
TABLET ORAL
Qty: 111 TABLET | Refills: 0 | Status: ON HOLD | OUTPATIENT
Start: 2017-12-18 | End: 2018-02-21 | Stop reason: HOSPADM

## 2017-12-18 RX ORDER — ACETAMINOPHEN 160 MG
TABLET,CHEWABLE ORAL
Qty: 106 TABLET | Refills: 0 | Status: SHIPPED | OUTPATIENT
Start: 2017-12-18

## 2017-12-18 RX ORDER — CARVEDILOL 25 MG/1
TABLET ORAL
Qty: 212 TABLET | Refills: 0 | Status: ON HOLD | OUTPATIENT
Start: 2017-12-18 | End: 2018-02-21

## 2017-12-18 RX ORDER — CLOPIDOGREL BISULFATE 75 MG/1
TABLET ORAL
Qty: 30 TABLET | Refills: 3 | Status: SHIPPED | OUTPATIENT
Start: 2017-12-18 | End: 2018-05-09 | Stop reason: SDUPTHER

## 2017-12-18 RX ORDER — CLONIDINE HYDROCHLORIDE 0.1 MG/1
TABLET ORAL
Qty: 60 TABLET | Refills: 3 | Status: SHIPPED | OUTPATIENT
Start: 2017-12-18 | End: 2018-05-09 | Stop reason: SDUPTHER

## 2017-12-18 RX ORDER — SIMVASTATIN 20 MG
20 TABLET ORAL NIGHTLY
Qty: 30 TABLET | Refills: 3 | Status: SHIPPED | OUTPATIENT
Start: 2017-12-18 | End: 2018-05-09 | Stop reason: SDUPTHER

## 2018-01-12 ENCOUNTER — APPOINTMENT (OUTPATIENT)
Dept: GENERAL RADIOLOGY | Age: 53
End: 2018-01-12
Payer: COMMERCIAL

## 2018-01-12 ENCOUNTER — APPOINTMENT (OUTPATIENT)
Dept: CT IMAGING | Age: 53
End: 2018-01-12
Payer: COMMERCIAL

## 2018-01-12 ENCOUNTER — HOSPITAL ENCOUNTER (EMERGENCY)
Age: 53
Discharge: HOME | End: 2018-01-13
Attending: FAMILY MEDICINE
Payer: COMMERCIAL

## 2018-01-12 ENCOUNTER — APPOINTMENT (OUTPATIENT)
Dept: INTERVENTIONAL RADIOLOGY/VASCULAR | Age: 53
End: 2018-01-12
Payer: COMMERCIAL

## 2018-01-12 DIAGNOSIS — M79.605 LEFT LEG PAIN: Primary | ICD-10-CM

## 2018-01-12 DIAGNOSIS — J18.9 PNEUMONIA DUE TO ORGANISM: ICD-10-CM

## 2018-01-12 LAB
ANION GAP SERPL CALCULATED.3IONS-SCNC: 10 MEQ/L (ref 8–16)
BASOPHILS # BLD: 0.6 %
BASOPHILS ABSOLUTE: 0 THOU/MM3 (ref 0–0.1)
BUN BLDV-MCNC: 12 MG/DL (ref 7–22)
C-REACTIVE PROTEIN: 0.27 MG/DL (ref 0–1)
CALCIUM SERPL-MCNC: 9.2 MG/DL (ref 8.5–10.5)
CHLORIDE BLD-SCNC: 106 MEQ/L (ref 98–111)
CO2: 29 MEQ/L (ref 23–33)
CREAT SERPL-MCNC: 1.2 MG/DL (ref 0.4–1.2)
EKG ATRIAL RATE: 80 BPM
EKG P AXIS: 57 DEGREES
EKG P-R INTERVAL: 132 MS
EKG Q-T INTERVAL: 400 MS
EKG QRS DURATION: 128 MS
EKG QTC CALCULATION (BAZETT): 461 MS
EKG R AXIS: -101 DEGREES
EKG T AXIS: 64 DEGREES
EKG VENTRICULAR RATE: 80 BPM
EOSINOPHIL # BLD: 9.4 %
EOSINOPHILS ABSOLUTE: 0.7 THOU/MM3 (ref 0–0.4)
FLU A ANTIGEN: NEGATIVE
FLU B ANTIGEN: NEGATIVE
GFR SERPL CREATININE-BSD FRML MDRD: 63 ML/MIN/1.73M2
GLUCOSE BLD-MCNC: 120 MG/DL (ref 70–108)
GROUP A STREP CULTURE, REFLEX: NEGATIVE
HCT VFR BLD CALC: 46.4 % (ref 42–52)
HEMOGLOBIN: 15.3 GM/DL (ref 14–18)
LACTIC ACID: 1 MMOL/L (ref 0.5–2.2)
LYMPHOCYTES # BLD: 18.5 %
LYMPHOCYTES ABSOLUTE: 1.4 THOU/MM3 (ref 1–4.8)
MCH RBC QN AUTO: 29.7 PG (ref 27–31)
MCHC RBC AUTO-ENTMCNC: 32.9 GM/DL (ref 33–37)
MCV RBC AUTO: 90.1 FL (ref 80–94)
MONOCYTES # BLD: 14 %
MONOCYTES ABSOLUTE: 1 THOU/MM3 (ref 0.4–1.3)
NUCLEATED RED BLOOD CELLS: 0 /100 WBC
OSMOLALITY CALCULATION: 289.7 MOSMOL/KG (ref 275–300)
PDW BLD-RTO: 13.9 % (ref 11.5–14.5)
PLATELET # BLD: 157 THOU/MM3 (ref 130–400)
PLATELET ESTIMATE: ADEQUATE
PMV BLD AUTO: 12.2 MCM (ref 7.4–10.4)
POTASSIUM SERPL-SCNC: 4.3 MEQ/L (ref 3.5–5.2)
RBC # BLD: 5.15 MILL/MM3 (ref 4.7–6.1)
REFLEX THROAT C + S: NORMAL
SCAN OF BLOOD SMEAR: NORMAL
SEG NEUTROPHILS: 57.5 %
SEGMENTED NEUTROPHILS ABSOLUTE COUNT: 4.3 THOU/MM3 (ref 1.8–7.7)
SODIUM BLD-SCNC: 145 MEQ/L (ref 135–145)
TROPONIN T: < 0.01 NG/ML
WBC # BLD: 7.4 THOU/MM3 (ref 4.8–10.8)

## 2018-01-12 PROCEDURE — 83605 ASSAY OF LACTIC ACID: CPT

## 2018-01-12 PROCEDURE — 6360000002 HC RX W HCPCS: Performed by: FAMILY MEDICINE

## 2018-01-12 PROCEDURE — 93005 ELECTROCARDIOGRAM TRACING: CPT

## 2018-01-12 PROCEDURE — 96366 THER/PROPH/DIAG IV INF ADDON: CPT

## 2018-01-12 PROCEDURE — 84484 ASSAY OF TROPONIN QUANT: CPT

## 2018-01-12 PROCEDURE — 85025 COMPLETE CBC W/AUTO DIFF WBC: CPT

## 2018-01-12 PROCEDURE — 99285 EMERGENCY DEPT VISIT HI MDM: CPT

## 2018-01-12 PROCEDURE — 93971 EXTREMITY STUDY: CPT

## 2018-01-12 PROCEDURE — 87070 CULTURE OTHR SPECIMN AEROBIC: CPT

## 2018-01-12 PROCEDURE — 6370000000 HC RX 637 (ALT 250 FOR IP): Performed by: FAMILY MEDICINE

## 2018-01-12 PROCEDURE — 87880 STREP A ASSAY W/OPTIC: CPT

## 2018-01-12 PROCEDURE — 87804 INFLUENZA ASSAY W/OPTIC: CPT

## 2018-01-12 PROCEDURE — 80048 BASIC METABOLIC PNL TOTAL CA: CPT

## 2018-01-12 PROCEDURE — 71046 X-RAY EXAM CHEST 2 VIEWS: CPT

## 2018-01-12 PROCEDURE — 70450 CT HEAD/BRAIN W/O DYE: CPT

## 2018-01-12 PROCEDURE — 96365 THER/PROPH/DIAG IV INF INIT: CPT

## 2018-01-12 PROCEDURE — 86140 C-REACTIVE PROTEIN: CPT

## 2018-01-12 PROCEDURE — 94640 AIRWAY INHALATION TREATMENT: CPT

## 2018-01-12 PROCEDURE — 36415 COLL VENOUS BLD VENIPUNCTURE: CPT

## 2018-01-12 RX ORDER — IPRATROPIUM BROMIDE AND ALBUTEROL SULFATE 2.5; .5 MG/3ML; MG/3ML
1 SOLUTION RESPIRATORY (INHALATION) ONCE
Status: COMPLETED | OUTPATIENT
Start: 2018-01-12 | End: 2018-01-12

## 2018-01-12 RX ORDER — LEVOFLOXACIN 500 MG/1
500 TABLET, FILM COATED ORAL DAILY
Qty: 10 TABLET | Refills: 0 | Status: SHIPPED | OUTPATIENT
Start: 2018-01-12 | End: 2018-01-22

## 2018-01-12 RX ORDER — LEVOFLOXACIN 5 MG/ML
500 INJECTION, SOLUTION INTRAVENOUS ONCE
Status: COMPLETED | OUTPATIENT
Start: 2018-01-12 | End: 2018-01-13

## 2018-01-12 RX ADMIN — IPRATROPIUM BROMIDE AND ALBUTEROL SULFATE 1 AMPULE: .5; 3 SOLUTION RESPIRATORY (INHALATION) at 19:41

## 2018-01-12 RX ADMIN — LEVOFLOXACIN 500 MG: 5 INJECTION, SOLUTION INTRAVENOUS at 22:43

## 2018-01-12 ASSESSMENT — PAIN DESCRIPTION - DESCRIPTORS: DESCRIPTORS: BURNING

## 2018-01-12 ASSESSMENT — ENCOUNTER SYMPTOMS
CHEST TIGHTNESS: 0
CONSTIPATION: 0
DIARRHEA: 0
COUGH: 1
SORE THROAT: 0
BACK PAIN: 0
BLOOD IN STOOL: 0
VOMITING: 0
COLOR CHANGE: 1
NAUSEA: 0
SHORTNESS OF BREATH: 0
ABDOMINAL PAIN: 0
WHEEZING: 0
RHINORRHEA: 0

## 2018-01-12 ASSESSMENT — PAIN DESCRIPTION - ORIENTATION: ORIENTATION: LEFT

## 2018-01-12 ASSESSMENT — PAIN DESCRIPTION - PAIN TYPE: TYPE: ACUTE PAIN

## 2018-01-12 ASSESSMENT — PAIN DESCRIPTION - LOCATION: LOCATION: LEG

## 2018-01-12 ASSESSMENT — PAIN DESCRIPTION - FREQUENCY: FREQUENCY: CONTINUOUS

## 2018-01-13 VITALS
WEIGHT: 162 LBS | DIASTOLIC BLOOD PRESSURE: 75 MMHG | RESPIRATION RATE: 16 BRPM | TEMPERATURE: 98.7 F | HEART RATE: 70 BPM | BODY MASS INDEX: 24.55 KG/M2 | HEIGHT: 68 IN | SYSTOLIC BLOOD PRESSURE: 119 MMHG | OXYGEN SATURATION: 94 %

## 2018-01-13 NOTE — ED NOTES
Vitals taken. Pt instructed to remove shoes and bottoms for vascular at this time.      Meeta Rodriguez RN  01/12/18 2121

## 2018-01-13 NOTE — ED PROVIDER NOTES
diarrhea, nausea and vomiting. Genitourinary: Negative for difficulty urinating, dysuria and hematuria. Musculoskeletal: Positive for myalgias (left thigh). Negative for back pain, joint swelling, neck pain and neck stiffness. Skin: Positive for color change (erythema over left thigh and left side of chest wall). Negative for pallor and rash. Warmth over left thigh, groin, and neck   Neurological: Positive for numbness (left thigh). Negative for dizziness, syncope, weakness, light-headedness and headaches. Hematological: Negative for adenopathy. Psychiatric/Behavioral: Negative for confusion and suicidal ideas. The patient is not nervous/anxious. PAST MEDICAL HISTORY    has a past medical history of [employer] biv icd; CAD (coronary artery disease); Cancer ([facility] Cardiac defibrillator in place; CHF (congestive heart failure) ([facility] COPD (chronic obstructive pulmonary disease) ([facility] Diabetes mellitus ([facility] Hypertension; Liver disease; Pacemaker; Pneumonia; Sleep apnea; SOB (shortness of breath); [employer] BiV ICD; and Unspecified cerebral artery occlusion with cerebral infarction. SURGICAL HISTORY      has a past surgical history that includes Pacemaker insertion; pacemaker placement; skin biopsy; Cardiac catheterization (12/2012); and other surgical history.     CURRENT MEDICATIONS       Previous Medications    ALBUTEROL (PROVENTIL) (2.5 MG/3ML) 0.083% NEBULIZER SOLUTION    Take 3 mLs by nebulization every 6 hours as needed for Wheezing    ALBUTEROL SULFATE HFA (PROAIR HFA) 108 (90 BASE) MCG/ACT INHALER    Inhale 2 puffs into the lungs every 6 hours as needed for Wheezing    AMLODIPINE (NORVASC) 5 MG TABLET    Take 1 tablet by mouth daily    BUDESONIDE-FORMOTEROL (SYMBICORT) 160-4.5 MCG/ACT AERO    Inhale 2 puffs into the lungs 2 times daily    CARVEDILOL (COREG) 25 MG TABLET    take 1 tablet by mouth twice a day    CLONIDINE (CATAPRES) 0.1 MG TABLET    take 1 tablet by mouth twice a place, and time. Vital signs are normal. He appears well-developed and well-nourished. He is active and cooperative. Non-toxic appearance. HENT:   Head: Normocephalic and atraumatic. Right Ear: External ear normal.   Left Ear: External ear normal.   Mouth/Throat: Mucous membranes are normal. Mucous membranes are not pale, not dry and not cyanotic. Posterior oropharyngeal erythema (mild) present. No oropharyngeal exudate, posterior oropharyngeal edema or tonsillar abscesses. Eyes: Conjunctivae, EOM and lids are normal. Right eye exhibits no exudate. Left eye exhibits no exudate. No scleral icterus. Neck: Normal range of motion and full passive range of motion without pain. Neck supple. No JVD present. No spinous process tenderness and no muscular tenderness present. No neck rigidity. No tracheal deviation and normal range of motion present. Cardiovascular: Normal rate, regular rhythm, normal heart sounds and intact distal pulses. Exam reveals no gallop. No murmur heard. Pulmonary/Chest: Effort normal and breath sounds normal. No accessory muscle usage or stridor. No respiratory distress. He has no decreased breath sounds. He has no wheezes. He has no rhonchi. He has no rales. He exhibits no tenderness. No erythema or signs of infection noted over the left side of the patient's chest wall. Abdominal: Normal appearance. He exhibits no distension. There is no tenderness. There is no rigidity, no rebound and no guarding. Hernia confirmed negative in the right inguinal area and confirmed negative in the left inguinal area. Genitourinary: Right testis shows no swelling and no tenderness. Left testis shows no swelling and no tenderness. Musculoskeletal: Normal range of motion. Right hip: Normal. He exhibits normal range of motion and normal strength. Left hip: Normal. He exhibits normal range of motion and normal strength.         Left knee: Normal. He exhibits normal range of motion, no swelling, no effusion, no ecchymosis, no deformity and no erythema. No tenderness found. Right upper leg: Normal. He exhibits no tenderness. Left upper leg: He exhibits tenderness (to palpation with mild warmth). He exhibits no bony tenderness, no swelling, no edema and no deformity. Lymphadenopathy:     He has no cervical adenopathy. Right: No inguinal adenopathy present. Left: No inguinal adenopathy present. Neurological: He is alert and oriented to person, place, and time. He is not disoriented. He displays no atrophy and no tremor. GCS eye subscore is 4. GCS verbal subscore is 5. GCS motor subscore is 6. Skin: Skin is warm and dry. No rash noted. No erythema. No erythema is noted over the patient's left thigh. Psychiatric: He has a normal mood and affect. His speech is normal and behavior is normal.   Nursing note and vitals reviewed. DIFFERENTIAL DIAGNOSIS:   DVT, pneumonia, influenza    DIAGNOSTIC RESULTS     EKG: All EKG's are interpreted by the Emergency Department Physician who either signs or Co-signs this chart in the absence of a cardiologist.  EKG interpreted by Anand Mcfarlane MD:    Lance. Rate: 80 bpm  TN interval: 132 ms  QRS duration: 128 ms  QTc: 461 ms  P-R-T axes: 57, 259, 64  Normal sinus rhythm  Right bundle branch block  No STEMI    RADIOLOGY: non-plain film images(s) such as CT, Ultrasound and MRI are read by the radiologist.    CT HEAD WO CONTRAST   Final Result      No acute ischemic infarct, hemorrhage, or mass effect. **This report has been created using voice recognition software. It may contain minor errors which are inherent in voice recognition technology. **      Final report electronically signed by Dr. Robert Albarado on 1/12/2018 11:00 PM      VL DUP LOWER EXTREMITY VENOUS LEFT   Final Result   No evidence of left lower extremity DVT. **This report has been created using voice recognition software.  It may contain minor errors which are inherent in voice recognition technology. **      Final report electronically signed by Dr. Phoebe Quiroga on 1/12/2018 10:13 PM      XR CHEST STANDARD (2 VW)   Final Result   Left basilar increased markings which may represent scar atelectasis or developing infiltrate. Possible small left effusion versus pleural thickening            **This report has been created using voice recognition software. It may contain minor errors which are inherent in voice recognition technology. **      Final report electronically signed by Dr. Kofi Sutherland on 1/12/2018 8:17 PM          LABS:   Labs Reviewed   CBC WITH AUTO DIFFERENTIAL - Abnormal; Notable for the following:        Result Value    MCHC 32.9 (*)     MPV 12.2 (*)     Eosinophils # 0.7 (*)     All other components within normal limits   BASIC METABOLIC PANEL - Abnormal; Notable for the following:     Glucose 120 (*)     All other components within normal limits   GLOMERULAR FILTRATION RATE, ESTIMATED - Abnormal; Notable for the following:     Est, Glom Filt Rate 63 (*)     All other components within normal limits   RAPID INFLUENZA A/B ANTIGENS   THROAT CULTURE    Narrative:     Source: throat       Site:           Current Antibiotics: not stated   C-REACTIVE PROTEIN   GROUP A STREP, REFLEX   TROPONIN   LACTIC ACID, PLASMA   ANION GAP   OSMOLALITY   SCAN OF BLOOD SMEAR       EMERGENCY DEPARTMENT COURSE:   Vitals:    Vitals:    01/12/18 1833 01/12/18 1941 01/12/18 2031 01/12/18 2209   BP: 126/82  106/77 108/71   Pulse: 87  75 70   Resp:   16 17   Temp: 98.7 °F (37.1 °C)      TempSrc: Oral      SpO2: 96% 93% 95% 93%   Weight: 162 lb (73.5 kg)      Height: 5' 8\" (1.727 m)          7:05 PM: The patient was seen and evaluated. MDM:  Patient seen and evaluated. Right thigh pain. He did have point tenderness to the right thigh. There was some mild warmth. No evidence of cellulitis. Doppler ultrasound was negative for DVT.   CT of the brain negative for

## 2018-01-14 LAB — THROAT/NOSE CULTURE: NORMAL

## 2018-01-17 ENCOUNTER — TELEPHONE (OUTPATIENT)
Dept: CARDIOLOGY CLINIC | Age: 53
End: 2018-01-17

## 2018-02-08 ENCOUNTER — HOSPITAL ENCOUNTER (EMERGENCY)
Age: 53
Discharge: LEFT BEFORE DISCHARGE | End: 2018-02-08
Attending: EMERGENCY MEDICINE
Payer: COMMERCIAL

## 2018-02-08 ENCOUNTER — NURSE ONLY (OUTPATIENT)
Dept: CARDIOLOGY CLINIC | Age: 53
End: 2018-02-08
Payer: COMMERCIAL

## 2018-02-08 ENCOUNTER — APPOINTMENT (OUTPATIENT)
Dept: GENERAL RADIOLOGY | Age: 53
End: 2018-02-08
Payer: COMMERCIAL

## 2018-02-08 VITALS
TEMPERATURE: 97.6 F | WEIGHT: 165 LBS | SYSTOLIC BLOOD PRESSURE: 106 MMHG | DIASTOLIC BLOOD PRESSURE: 83 MMHG | HEIGHT: 68 IN | HEART RATE: 81 BPM | OXYGEN SATURATION: 96 % | BODY MASS INDEX: 25.01 KG/M2 | RESPIRATION RATE: 12 BRPM

## 2018-02-08 DIAGNOSIS — Z95.810 ICD (IMPLANTABLE CARDIOVERTER-DEFIBRILLATOR) IN PLACE: Primary | ICD-10-CM

## 2018-02-08 DIAGNOSIS — R55 SYNCOPE, UNSPECIFIED SYNCOPE TYPE: Primary | ICD-10-CM

## 2018-02-08 LAB
ALBUMIN SERPL-MCNC: 3.6 G/DL (ref 3.5–5.1)
ALP BLD-CCNC: 83 U/L (ref 38–126)
ALT SERPL-CCNC: 9 U/L (ref 11–66)
ANION GAP SERPL CALCULATED.3IONS-SCNC: 9 MEQ/L (ref 8–16)
AST SERPL-CCNC: 11 U/L (ref 5–40)
BASOPHILS # BLD: 0.7 %
BASOPHILS ABSOLUTE: 0.1 THOU/MM3 (ref 0–0.1)
BILIRUB SERPL-MCNC: 0.3 MG/DL (ref 0.3–1.2)
BUN BLDV-MCNC: 12 MG/DL (ref 7–22)
CALCIUM SERPL-MCNC: 9.2 MG/DL (ref 8.5–10.5)
CHLORIDE BLD-SCNC: 106 MEQ/L (ref 98–111)
CO2: 29 MEQ/L (ref 23–33)
CREAT SERPL-MCNC: 1.3 MG/DL (ref 0.4–1.2)
EKG ATRIAL RATE: 85 BPM
EKG P AXIS: 83 DEGREES
EKG P-R INTERVAL: 128 MS
EKG Q-T INTERVAL: 434 MS
EKG QRS DURATION: 154 MS
EKG QTC CALCULATION (BAZETT): 516 MS
EKG R AXIS: -120 DEGREES
EKG T AXIS: 77 DEGREES
EKG VENTRICULAR RATE: 85 BPM
EOSINOPHIL # BLD: 6.7 %
EOSINOPHILS ABSOLUTE: 0.6 THOU/MM3 (ref 0–0.4)
GFR SERPL CREATININE-BSD FRML MDRD: 58 ML/MIN/1.73M2
GLUCOSE BLD-MCNC: 121 MG/DL (ref 70–108)
HCT VFR BLD CALC: 48 % (ref 42–52)
HEMOGLOBIN: 16.1 GM/DL (ref 14–18)
LIPASE: 119.2 U/L (ref 5.6–51.3)
LYMPHOCYTES # BLD: 16.3 %
LYMPHOCYTES ABSOLUTE: 1.5 THOU/MM3 (ref 1–4.8)
MCH RBC QN AUTO: 30.6 PG (ref 27–31)
MCHC RBC AUTO-ENTMCNC: 33.6 GM/DL (ref 33–37)
MCV RBC AUTO: 91 FL (ref 80–94)
MONOCYTES # BLD: 16 %
MONOCYTES ABSOLUTE: 1.5 THOU/MM3 (ref 0.4–1.3)
NUCLEATED RED BLOOD CELLS: 0 /100 WBC
OSMOLALITY CALCULATION: 287.8 MOSMOL/KG (ref 275–300)
PDW BLD-RTO: 13.7 % (ref 11.5–14.5)
PLATELET # BLD: 139 THOU/MM3 (ref 130–400)
PLATELET ESTIMATE: ADEQUATE
PMV BLD AUTO: 12.5 FL (ref 7.4–10.4)
POTASSIUM REFLEX MAGNESIUM: 4.6 MEQ/L (ref 3.5–5.2)
PRO-BNP: 38.6 PG/ML (ref 0–900)
PROLACTIN: 19 NG/ML
RBC # BLD: 5.27 MILL/MM3 (ref 4.7–6.1)
SCAN OF BLOOD SMEAR: NORMAL
SEG NEUTROPHILS: 60.3 %
SEGMENTED NEUTROPHILS ABSOLUTE COUNT: 5.5 THOU/MM3 (ref 1.8–7.7)
SODIUM BLD-SCNC: 144 MEQ/L (ref 135–145)
TOTAL PROTEIN: 6.7 G/DL (ref 6.1–8)
TROPONIN T: < 0.01 NG/ML
WBC # BLD: 9.1 THOU/MM3 (ref 4.8–10.8)

## 2018-02-08 PROCEDURE — 80053 COMPREHEN METABOLIC PANEL: CPT

## 2018-02-08 PROCEDURE — A6453 SELF-ADHER BAND W <3"/YD: HCPCS

## 2018-02-08 PROCEDURE — 93284 PRGRMG EVAL IMPLANTABLE DFB: CPT | Performed by: INTERNAL MEDICINE

## 2018-02-08 PROCEDURE — 83690 ASSAY OF LIPASE: CPT

## 2018-02-08 PROCEDURE — 85025 COMPLETE CBC W/AUTO DIFF WBC: CPT

## 2018-02-08 PROCEDURE — 73130 X-RAY EXAM OF HAND: CPT

## 2018-02-08 PROCEDURE — 84146 ASSAY OF PROLACTIN: CPT

## 2018-02-08 PROCEDURE — 83880 ASSAY OF NATRIURETIC PEPTIDE: CPT

## 2018-02-08 PROCEDURE — 93005 ELECTROCARDIOGRAM TRACING: CPT | Performed by: EMERGENCY MEDICINE

## 2018-02-08 PROCEDURE — 84484 ASSAY OF TROPONIN QUANT: CPT

## 2018-02-08 PROCEDURE — 99284 EMERGENCY DEPT VISIT MOD MDM: CPT

## 2018-02-08 PROCEDURE — 36415 COLL VENOUS BLD VENIPUNCTURE: CPT

## 2018-02-08 ASSESSMENT — PAIN DESCRIPTION - DESCRIPTORS
DESCRIPTORS: SHARP
DESCRIPTORS: HEADACHE;THROBBING

## 2018-02-08 ASSESSMENT — PAIN DESCRIPTION - PAIN TYPE
TYPE: ACUTE PAIN
TYPE: ACUTE PAIN

## 2018-02-08 ASSESSMENT — PAIN DESCRIPTION - ORIENTATION: ORIENTATION: LEFT

## 2018-02-08 ASSESSMENT — PAIN SCALES - GENERAL
PAINLEVEL_OUTOF10: 4
PAINLEVEL_OUTOF10: 6
PAINLEVEL_OUTOF10: 5

## 2018-02-08 ASSESSMENT — PAIN DESCRIPTION - LOCATION
LOCATION: HEAD
LOCATION: FINGER (COMMENT WHICH ONE)

## 2018-02-08 ASSESSMENT — PAIN DESCRIPTION - DIRECTION: RADIATING_TOWARDS: LEFT MIDDLE FINGER

## 2018-02-08 ASSESSMENT — PAIN DESCRIPTION - FREQUENCY: FREQUENCY: CONTINUOUS

## 2018-02-08 NOTE — ED NOTES
Pt up to bathroom with steady gait. Pt requesting O2 viav NC but Pox 95-97%. Will check with Dr Parish Schaffer. No other c/o or needs. Update given.   Will continue to monitor,     Ro Joseph RN  02/08/18 0206

## 2018-02-08 NOTE — ED PROVIDER NOTES
[facility]  eMERGENCY dEPARTMENT eNCOUnter          [address] [facility]       Chief Complaint   Patient presents with    Loss of Consciousness    Finger Pain     Left finger pain    Cough     unproductive. Pt states getting over pneumonia been on atb. Nurses Notes reviewed and I agree except as noted in the HPI. HISTORY OF PRESENT ILLNESS    Martha Riley is a 47 y.o. male with a past medical history of CAD status post defibrillator placement, CHF, COPD, DM, and HTN who presents to the ED for evaluation of a syncopal episode. The patient was at his normal outpatient pacemaker/defib interrogation appointment this morning when he began to feel lightheaded. While he was sitting and the pacemaker was being interrogated the patient had a syncopal episode, where the nurse had reported that the patient was briefly unconscious and shaking in his chair. The patient currently complains of left middle finger pain which he believes is from holding on the chair. He also reports feeling dizzy and short of breath, however is chronically short of breath and on oxygen support. The patient denies any chest pain. REVIEW OF SYSTEMS     Constitutional: no fever or chills  Eyes: no visual changes  ENT: no sore throat  Respiratory: positive for dyspnea  Cardiovascular: no chest pain  Gastrointestinal : no abdominal pain      Remainder of review of systems is otherwise reviewed as negative. PAST MEDICAL HISTORY    has a past medical history of [employer] b[employer] icd; CAD (coronary artery disease); Cancer ([address] Cardiac defibrillator in place; CHF (congestive heart failure) ([address] COPD (chronic obstructive pulmonary disease) ([address] Diabetes mellitus ([address] Hypertension; Liver disease; Pacemaker; Pneumonia; Sleep apnea; SOB (shortness of breath); [employer] ICD; and Unspecified cerebral artery occlusion with cerebral infarction.     SURGICAL HISTORY      has a past surgical history that includes ALLERGIES     is allergic to morphine and tramadol. FAMILY HISTORY     indicated that his mother is . He indicated that his father is . He indicated that all of his three sisters are alive. He indicated that his maternal aunt is . family history includes Cancer in his father; Diabetes in his maternal aunt; High Blood Pressure in his mother; Stroke in his mother. SOCIAL HISTORY      reports that he quit smoking about 6 years ago. His smoking use included Cigarettes. He has a 45.00 pack-year smoking history. He has never used smokeless tobacco. He reports that he does not drink alcohol or use drugs. PHYSICAL EXAM     INITIAL VITALS:  height is 5' 8\" (1.727 m) and weight is 165 lb (74.8 kg). His oral temperature is 97.6 °F (36.4 °C). His blood pressure is 106/83 and his pulse is 81. His respiration is 12 and oxygen saturation is 96%. Constitutional: Well appearing and non-toxic   Eyes:  Pupils are equal and reactive, extraocular muscles intact   HENT:  Atraumatic appearing  oropharynx moist, no pharyngeal exudates. Neck- normal range of motion, supple   Respiratory:  No wheezing, rhonchi or rales  Cardiovascular: regular,   GI:  Non tender, no rigidity, rebound or guarding  Musculoskeletal:  2/4 distal pulses, no pitting edema  Integument: warm and dry  Neurologic:  Alert & oriented x 3, CN 2-12 are grossly intact. Equal strength in the upper and lower extremities bilaterally          DIAGNOSTIC RESULTS     EKG: All EKG's are interpreted by the Emergency Department Physician who either signs or Co-signs this chart in the absence of a cardiologist.   EKG interpreted by me showing paced rhythm at a rate of 85, QRS of 154, QTc of 516, axis of 240. RADIOLOGY: non-plain film images(s) such as CT, Ultrasound and MRI are read by the radiologist.  XR HAND LEFT (MIN 3 VIEWS)   Final Result      No acute fracture or dislocation is identified.                **This report has been created using voice recognition software. It may contain minor errors which are inherent in voice recognition technology. **      Final report electronically signed by Dr. Frederick Cardenas on 2/8/2018 10:21 AM           LABS:   Labs Reviewed   CBC WITH AUTO DIFFERENTIAL - Abnormal; Notable for the following:        Result Value    MPV 12.5 (*)     Monocytes # 1.5 (*)     Eosinophils # 0.6 (*)     All other components within normal limits   LIPASE - Abnormal; Notable for the following:     Lipase 119.2 (*)     All other components within normal limits   COMPREHENSIVE METABOLIC PANEL W/ REFLEX TO MG FOR LOW K - Abnormal; Notable for the following:     Glucose 121 (*)     CREATININE 1.3 (*)     ALT 9 (*)     All other components within normal limits   GLOMERULAR FILTRATION RATE, ESTIMATED - Abnormal; Notable for the following:     Est, Glom Filt Rate 58 (*)     All other components within normal limits   TROPONIN   BRAIN NATRIURETIC PEPTIDE   PROLACTIN   OSMOLALITY   ANION GAP   SCAN OF BLOOD SMEAR       EMERGENCY DEPARTMENT COURSE:   Vitals:    Vitals:    02/08/18 0918 02/08/18 1048 02/08/18 1129 02/08/18 1237   BP: 124/81 123/82 (!) 125/94 106/83   Pulse: 82 70 90 81   Resp: 14 20 27 12   Temp: 97.8 °F (36.6 °C)   97.6 °F (36.4 °C)   TempSrc: Oral   Oral   SpO2: 97% 96% 96% 96%   Weight: 165 lb (74.8 kg)      Height: 5' 8\" (1.727 m)         He is clinically well-appearing but had an episode of a possible syncope versus seizure. I contacted the cardiologist recommended that we admit him for 24-hour observation on the cardiac monitor. However the patient made it pretty clear that he was not willing to stay and ended up signing out AMA. It was felt that he might have injured his finger during this episode but the x-ray is actually negative. Since he is still having persistent pain there we have placed a splint. CRITICAL CARE:   None     CONSULTS:  None     PROCEDURES:  None    FINAL IMPRESSION      1.  Syncope, unspecified syncope type          DISPOSITION/PLAN   AMA    DISCHARGE MEDICATIONS:  Discharge Medication List as of 2/8/2018 12:16 PM          (Please note that portions of this note were completed with a voice recognition program.  Efforts were made to edit the dictations but occasionally words are mis-transcribed.)    This document serves as a record of the services and decisions personally performed and made by Owen Spears DO. . It was created on their behalf by Mague Stuart, a trained medical scribe. The creation of this document is based the provider's statements to the medical scribe. Signed by: Rosemary Grace, 2:40 PM 02/08/18     Provider: The information in this document, created by the medical scribe for me, accurately reflects the services I personally performed and the decisions made by me.      Owen Spears DO. 2:40 PM 02/08/18           Owen Spears DO  02/08/18 1545

## 2018-02-15 ENCOUNTER — OFFICE VISIT (OUTPATIENT)
Dept: CARDIOLOGY CLINIC | Age: 53
End: 2018-02-15
Payer: COMMERCIAL

## 2018-02-15 VITALS
DIASTOLIC BLOOD PRESSURE: 80 MMHG | SYSTOLIC BLOOD PRESSURE: 100 MMHG | WEIGHT: 169.4 LBS | HEART RATE: 86 BPM | BODY MASS INDEX: 25.67 KG/M2 | HEIGHT: 68 IN

## 2018-02-15 DIAGNOSIS — R42 DIZZINESS: ICD-10-CM

## 2018-02-15 DIAGNOSIS — R55 NEAR SYNCOPE: ICD-10-CM

## 2018-02-15 DIAGNOSIS — I42.8 NONISCHEMIC CARDIOMYOPATHY (HCC): ICD-10-CM

## 2018-02-15 DIAGNOSIS — Z87.898 HISTORY OF SYNCOPE: Primary | ICD-10-CM

## 2018-02-15 DIAGNOSIS — E78.00 PURE HYPERCHOLESTEROLEMIA: ICD-10-CM

## 2018-02-15 DIAGNOSIS — Z95.810 ICD (IMPLANTABLE CARDIOVERTER-DEFIBRILLATOR) IN PLACE: ICD-10-CM

## 2018-02-15 PROCEDURE — G8427 DOCREV CUR MEDS BY ELIG CLIN: HCPCS | Performed by: INTERNAL MEDICINE

## 2018-02-15 PROCEDURE — G8417 CALC BMI ABV UP PARAM F/U: HCPCS | Performed by: INTERNAL MEDICINE

## 2018-02-15 PROCEDURE — 99214 OFFICE O/P EST MOD 30 MIN: CPT | Performed by: INTERNAL MEDICINE

## 2018-02-15 PROCEDURE — 3017F COLORECTAL CA SCREEN DOC REV: CPT | Performed by: INTERNAL MEDICINE

## 2018-02-15 PROCEDURE — G8484 FLU IMMUNIZE NO ADMIN: HCPCS | Performed by: INTERNAL MEDICINE

## 2018-02-15 PROCEDURE — 1036F TOBACCO NON-USER: CPT | Performed by: INTERNAL MEDICINE

## 2018-02-15 PROCEDURE — G8598 ASA/ANTIPLAT THER USED: HCPCS | Performed by: INTERNAL MEDICINE

## 2018-02-15 NOTE — PROGRESS NOTES
Chief Complaint   Patient presents with   [address] Patient     ED 2/7/18 syncope    Hypertension    Coronary Artery Disease   New pt here for follow up ER 2/7/18 syncope.  After ICD check    Feel dizziness intermittent and near syncope and sit down and feel better with out syncope  Chronic near syncope for over 1 yr  Had one syncope in long time  As he has warning symptom    Denied cp, palpitation or edema    Cough chronic    Ex smoker and quit 5 yrs back   sob  On exertion    Copd on Home O2    Seen last dr. Imani Quintana 2013    Patient Active Problem List   Diagnosis    CAD (coronary artery disease)    COPD with exacerbation ([address]    Dizziness and giddiness    COPD, severe ([address]    History of tobacco use    Chronic cough    DM (diabetes mellitus) ([address]    Dyslipidemia    HTN (hypertension)    LV dysfunction    CHF NYHA class II ([address]    Coronary artery anomaly    Snoring    Daytime somnolence    Fatigue    Shortness of breath    Pneumonia    COPD (chronic obstructive pulmonary disease) ([address]    CHF (congestive heart failure) ([address]    Diabetes mellitus ([address]    Obstructive sleep apnea of adult    Ex-smoker   [employer] biv icd    Chronic obstructive pulmonary disease ([address]    Chest pain    Chronic systolic heart failure (HCC)    CKD (chronic kidney disease) stage 3, GFR 30-59 ml/min    Hypernatremia    Chronic respiratory failure with hypoxia (HCC)    Non-compliance    Nonischemic cardiomyopathy (HCC)    Dizziness    Near syncope    History of syncope    Pure hypercholesterolemia       Past Surgical History:   Procedure Laterality Date    CARDIAC [facility]TER[facility]  12/2012    [facility]    OTHER SURGICAL HISTORY      loop recorder implant prior to pacemaker in [address] [place] [address] [id number]         Allergies   Allergen Reactions    Morphine Hives     He says he can take norco without problem    Tramadol Hives        Family History

## 2018-02-19 ENCOUNTER — APPOINTMENT (OUTPATIENT)
Dept: CT IMAGING | Age: 53
DRG: 113 | End: 2018-02-19
Payer: COMMERCIAL

## 2018-02-19 ENCOUNTER — HOSPITAL ENCOUNTER (INPATIENT)
Age: 53
LOS: 2 days | Discharge: HOME | DRG: 113 | End: 2018-02-21
Attending: INTERNAL MEDICINE | Admitting: FAMILY MEDICINE
Payer: COMMERCIAL

## 2018-02-19 ENCOUNTER — APPOINTMENT (OUTPATIENT)
Dept: GENERAL RADIOLOGY | Age: 53
DRG: 113 | End: 2018-02-19
Payer: COMMERCIAL

## 2018-02-19 DIAGNOSIS — J10.1 INFLUENZA B: Primary | ICD-10-CM

## 2018-02-19 DIAGNOSIS — I50.22 CHF NYHA CLASS II, CHRONIC, SYSTOLIC (HCC): ICD-10-CM

## 2018-02-19 DIAGNOSIS — R07.9 CHEST PAIN, UNSPECIFIED TYPE: ICD-10-CM

## 2018-02-19 PROBLEM — N18.9 ACUTE KIDNEY INJURY SUPERIMPOSED ON CKD (HCC): Status: ACTIVE | Noted: 2018-02-19

## 2018-02-19 PROBLEM — N17.9 ACUTE KIDNEY INJURY SUPERIMPOSED ON CKD (HCC): Status: ACTIVE | Noted: 2018-02-19

## 2018-02-19 LAB
ALBUMIN SERPL-MCNC: 3.3 G/DL (ref 3.5–5.1)
ALP BLD-CCNC: 66 U/L (ref 38–126)
ALT SERPL-CCNC: 14 U/L (ref 11–66)
AMPHETAMINE+METHAMPHETAMINE URINE SCREEN: NEGATIVE
ANION GAP SERPL CALCULATED.3IONS-SCNC: 13 MEQ/L (ref 8–16)
AST SERPL-CCNC: 16 U/L (ref 5–40)
BACTERIA: ABNORMAL /HPF
BARBITURATE QUANTITATIVE URINE: NEGATIVE
BASOPHILS # BLD: 1.1 %
BASOPHILS ABSOLUTE: 0.1 THOU/MM3 (ref 0–0.1)
BENZODIAZEPINE QUANTITATIVE URINE: NEGATIVE
BILIRUB SERPL-MCNC: 0.3 MG/DL (ref 0.3–1.2)
BILIRUBIN DIRECT: < 0.2 MG/DL (ref 0–0.3)
BILIRUBIN URINE: ABNORMAL
BLOOD, URINE: NEGATIVE
BUN BLDV-MCNC: 27 MG/DL (ref 7–22)
CALCIUM SERPL-MCNC: 8.6 MG/DL (ref 8.5–10.5)
CANNABINOID QUANTITATIVE URINE: NEGATIVE
CASTS 2: ABNORMAL /LPF
CASTS UA: ABNORMAL /LPF
CHARACTER, URINE: ABNORMAL
CHLORIDE BLD-SCNC: 105 MEQ/L (ref 98–111)
CO2: 24 MEQ/L (ref 23–33)
COCAINE METABOLITE QUANTITATIVE URINE: NEGATIVE
COLOR: ABNORMAL
CREAT SERPL-MCNC: 1.8 MG/DL (ref 0.4–1.2)
CRYSTALS, UA: ABNORMAL
D-DIMER QUANTITATIVE: 413 NG/ML FEU (ref 0–500)
EKG ATRIAL RATE: 87 BPM
EKG P AXIS: 88 DEGREES
EKG P-R INTERVAL: 156 MS
EKG Q-T INTERVAL: 376 MS
EKG QRS DURATION: 124 MS
EKG QTC CALCULATION (BAZETT): 452 MS
EKG R AXIS: -99 DEGREES
EKG T AXIS: 83 DEGREES
EKG VENTRICULAR RATE: 87 BPM
EOSINOPHIL # BLD: 0.2 %
EOSINOPHILS ABSOLUTE: 0 THOU/MM3 (ref 0–0.4)
EPITHELIAL CELLS, UA: ABNORMAL /HPF
FLU A ANTIGEN: NEGATIVE
FLU B ANTIGEN: POSITIVE
GFR SERPL CREATININE-BSD FRML MDRD: 40 ML/MIN/1.73M2
GLUCOSE BLD-MCNC: 142 MG/DL (ref 70–108)
GLUCOSE URINE: NEGATIVE MG/DL
HCT VFR BLD CALC: 50.7 % (ref 42–52)
HEMOGLOBIN: 16.8 GM/DL (ref 14–18)
ICTOTEST: NEGATIVE
KETONES, URINE: ABNORMAL
LACTIC ACID: 1.5 MMOL/L (ref 0.5–2.2)
LEUKOCYTE ESTERASE, URINE: NEGATIVE
LIPASE: 73 U/L (ref 5.6–51.3)
LYMPHOCYTES # BLD: 11.4 %
LYMPHOCYTES ABSOLUTE: 0.8 THOU/MM3 (ref 1–4.8)
MCH RBC QN AUTO: 30.3 PG (ref 27–31)
MCHC RBC AUTO-ENTMCNC: 33.1 GM/DL (ref 33–37)
MCV RBC AUTO: 91.5 FL (ref 80–94)
MISCELLANEOUS 2: ABNORMAL
MONOCYTES # BLD: 20.3 %
MONOCYTES ABSOLUTE: 1.5 THOU/MM3 (ref 0.4–1.3)
MUCUS: ABNORMAL
NITRITE, URINE: NEGATIVE
NUCLEATED RED BLOOD CELLS: 0 /100 WBC
OPIATES, URINE: NEGATIVE
OSMOLALITY CALCULATION: 290.7 MOSMOL/KG (ref 275–300)
OXYCODONE: NEGATIVE
PDW BLD-RTO: 13.9 % (ref 11.5–14.5)
PH UA: 5.5
PHENCYCLIDINE QUANTITATIVE URINE: NEGATIVE
PLATELET # BLD: 100 THOU/MM3 (ref 130–400)
PMV BLD AUTO: 11.5 FL (ref 7.4–10.4)
POTASSIUM SERPL-SCNC: 4.7 MEQ/L (ref 3.5–5.2)
PRO-BNP: 42.7 PG/ML (ref 0–900)
PROCALCITONIN: 0.08 NG/ML (ref 0.01–0.09)
PROTEIN UA: 100
RBC # BLD: 5.54 MILL/MM3 (ref 4.7–6.1)
RBC URINE: ABNORMAL /HPF
RENAL EPITHELIAL, UA: ABNORMAL
SEG NEUTROPHILS: 67 %
SEGMENTED NEUTROPHILS ABSOLUTE COUNT: 4.8 THOU/MM3 (ref 1.8–7.7)
SODIUM BLD-SCNC: 142 MEQ/L (ref 135–145)
SPECIFIC GRAVITY, URINE: > 1.03 (ref 1–1.03)
TOTAL PROTEIN: 6.4 G/DL (ref 6.1–8)
TROPONIN T: < 0.01 NG/ML
UROBILINOGEN, URINE: 0.2 EU/DL
WBC # BLD: 7.2 THOU/MM3 (ref 4.8–10.8)
WBC UA: ABNORMAL /HPF
YEAST: ABNORMAL

## 2018-02-19 PROCEDURE — 6370000000 HC RX 637 (ALT 250 FOR IP): Performed by: FAMILY MEDICINE

## 2018-02-19 PROCEDURE — 71046 X-RAY EXAM CHEST 2 VIEWS: CPT

## 2018-02-19 PROCEDURE — G0378 HOSPITAL OBSERVATION PER HR: HCPCS

## 2018-02-19 PROCEDURE — 81001 URINALYSIS AUTO W/SCOPE: CPT

## 2018-02-19 PROCEDURE — 36415 COLL VENOUS BLD VENIPUNCTURE: CPT

## 2018-02-19 PROCEDURE — 1200000003 HC TELEMETRY R&B

## 2018-02-19 PROCEDURE — 84145 PROCALCITONIN (PCT): CPT

## 2018-02-19 PROCEDURE — 83690 ASSAY OF LIPASE: CPT

## 2018-02-19 PROCEDURE — 6360000004 HC RX CONTRAST MEDICATION: Performed by: PHYSICIAN ASSISTANT

## 2018-02-19 PROCEDURE — 93005 ELECTROCARDIOGRAM TRACING: CPT | Performed by: INTERNAL MEDICINE

## 2018-02-19 PROCEDURE — 84484 ASSAY OF TROPONIN QUANT: CPT

## 2018-02-19 PROCEDURE — 85025 COMPLETE CBC W/AUTO DIFF WBC: CPT

## 2018-02-19 PROCEDURE — 99285 EMERGENCY DEPT VISIT HI MDM: CPT

## 2018-02-19 PROCEDURE — 87804 INFLUENZA ASSAY W/OPTIC: CPT

## 2018-02-19 PROCEDURE — 83605 ASSAY OF LACTIC ACID: CPT

## 2018-02-19 PROCEDURE — 6370000000 HC RX 637 (ALT 250 FOR IP): Performed by: PHYSICIAN ASSISTANT

## 2018-02-19 PROCEDURE — 99223 1ST HOSP IP/OBS HIGH 75: CPT | Performed by: FAMILY MEDICINE

## 2018-02-19 PROCEDURE — 80307 DRUG TEST PRSMV CHEM ANLYZR: CPT

## 2018-02-19 PROCEDURE — 83880 ASSAY OF NATRIURETIC PEPTIDE: CPT

## 2018-02-19 PROCEDURE — 87040 BLOOD CULTURE FOR BACTERIA: CPT

## 2018-02-19 PROCEDURE — 2580000003 HC RX 258: Performed by: PHYSICIAN ASSISTANT

## 2018-02-19 PROCEDURE — 71260 CT THORAX DX C+: CPT

## 2018-02-19 PROCEDURE — 85379 FIBRIN DEGRADATION QUANT: CPT

## 2018-02-19 PROCEDURE — 80053 COMPREHEN METABOLIC PANEL: CPT

## 2018-02-19 PROCEDURE — 82248 BILIRUBIN DIRECT: CPT

## 2018-02-19 PROCEDURE — 71046 X-RAY EXAM CHEST 2 VIEWS: CPT | Performed by: INTERNAL MEDICINE

## 2018-02-19 RX ORDER — GLIPIZIDE 5 MG/1
5 TABLET ORAL
Status: DISCONTINUED | OUTPATIENT
Start: 2018-02-20 | End: 2018-02-20

## 2018-02-19 RX ORDER — OSELTAMIVIR PHOSPHATE 75 MG/1
75 CAPSULE ORAL 2 TIMES DAILY
Status: DISCONTINUED | OUTPATIENT
Start: 2018-02-19 | End: 2018-02-20 | Stop reason: DRUGHIGH

## 2018-02-19 RX ORDER — CARVEDILOL 25 MG/1
25 TABLET ORAL 2 TIMES DAILY
Status: DISCONTINUED | OUTPATIENT
Start: 2018-02-20 | End: 2018-02-21 | Stop reason: HOSPADM

## 2018-02-19 RX ORDER — NITROGLYCERIN 0.4 MG/1
0.4 TABLET SUBLINGUAL EVERY 5 MIN PRN
Status: DISCONTINUED | OUTPATIENT
Start: 2018-02-19 | End: 2018-02-21 | Stop reason: HOSPADM

## 2018-02-19 RX ORDER — BUDESONIDE AND FORMOTEROL FUMARATE DIHYDRATE 160; 4.5 UG/1; UG/1
2 AEROSOL RESPIRATORY (INHALATION) 2 TIMES DAILY
Status: DISCONTINUED | OUTPATIENT
Start: 2018-02-19 | End: 2018-02-20 | Stop reason: CLARIF

## 2018-02-19 RX ORDER — CLOPIDOGREL BISULFATE 75 MG/1
75 TABLET ORAL DAILY
Status: DISCONTINUED | OUTPATIENT
Start: 2018-02-20 | End: 2018-02-21 | Stop reason: HOSPADM

## 2018-02-19 RX ORDER — ALBUTEROL SULFATE 90 UG/1
2 AEROSOL, METERED RESPIRATORY (INHALATION) EVERY 6 HOURS PRN
Status: DISCONTINUED | OUTPATIENT
Start: 2018-02-19 | End: 2018-02-21 | Stop reason: HOSPADM

## 2018-02-19 RX ORDER — ACETAMINOPHEN 325 MG/1
650 TABLET ORAL EVERY 4 HOURS PRN
Status: DISCONTINUED | OUTPATIENT
Start: 2018-02-19 | End: 2018-02-21 | Stop reason: HOSPADM

## 2018-02-19 RX ORDER — 0.9 % SODIUM CHLORIDE 0.9 %
1000 INTRAVENOUS SOLUTION INTRAVENOUS ONCE
Status: COMPLETED | OUTPATIENT
Start: 2018-02-19 | End: 2018-02-19

## 2018-02-19 RX ORDER — ONDANSETRON 2 MG/ML
4 INJECTION INTRAMUSCULAR; INTRAVENOUS EVERY 6 HOURS PRN
Status: DISCONTINUED | OUTPATIENT
Start: 2018-02-19 | End: 2018-02-21 | Stop reason: HOSPADM

## 2018-02-19 RX ORDER — ALBUTEROL SULFATE 2.5 MG/3ML
2.5 SOLUTION RESPIRATORY (INHALATION) EVERY 6 HOURS PRN
Status: DISCONTINUED | OUTPATIENT
Start: 2018-02-19 | End: 2018-02-21 | Stop reason: HOSPADM

## 2018-02-19 RX ORDER — ASPIRIN 81 MG/1
81 TABLET, CHEWABLE ORAL DAILY
Status: DISCONTINUED | OUTPATIENT
Start: 2018-02-20 | End: 2018-02-21 | Stop reason: HOSPADM

## 2018-02-19 RX ORDER — SODIUM CHLORIDE 0.9 % (FLUSH) 0.9 %
10 SYRINGE (ML) INJECTION PRN
Status: DISCONTINUED | OUTPATIENT
Start: 2018-02-19 | End: 2018-02-21 | Stop reason: HOSPADM

## 2018-02-19 RX ORDER — LOSARTAN POTASSIUM 25 MG/1
25 TABLET ORAL DAILY
Status: DISCONTINUED | OUTPATIENT
Start: 2018-02-20 | End: 2018-02-20

## 2018-02-19 RX ORDER — AMLODIPINE BESYLATE 5 MG/1
5 TABLET ORAL DAILY
Status: DISCONTINUED | OUTPATIENT
Start: 2018-02-20 | End: 2018-02-21 | Stop reason: HOSPADM

## 2018-02-19 RX ORDER — ATORVASTATIN CALCIUM 40 MG/1
40 TABLET, FILM COATED ORAL NIGHTLY
Status: DISCONTINUED | OUTPATIENT
Start: 2018-02-20 | End: 2018-02-21 | Stop reason: HOSPADM

## 2018-02-19 RX ORDER — ASPIRIN 81 MG/1
324 TABLET, CHEWABLE ORAL ONCE
Status: COMPLETED | OUTPATIENT
Start: 2018-02-19 | End: 2018-02-19

## 2018-02-19 RX ORDER — SODIUM CHLORIDE 0.9 % (FLUSH) 0.9 %
10 SYRINGE (ML) INJECTION EVERY 12 HOURS SCHEDULED
Status: DISCONTINUED | OUTPATIENT
Start: 2018-02-19 | End: 2018-02-21 | Stop reason: HOSPADM

## 2018-02-19 RX ORDER — SODIUM CHLORIDE 9 MG/ML
INJECTION, SOLUTION INTRAVENOUS CONTINUOUS
Status: ACTIVE | OUTPATIENT
Start: 2018-02-19 | End: 2018-02-20

## 2018-02-19 RX ADMIN — IOPAMIDOL 80 ML: 755 INJECTION, SOLUTION INTRAVENOUS at 21:38

## 2018-02-19 RX ADMIN — ASPIRIN 81 MG 324 MG: 81 TABLET ORAL at 16:58

## 2018-02-19 RX ADMIN — SODIUM CHLORIDE 1000 ML: 9 INJECTION, SOLUTION INTRAVENOUS at 17:18

## 2018-02-19 ASSESSMENT — PAIN DESCRIPTION - FREQUENCY
FREQUENCY: CONTINUOUS

## 2018-02-19 ASSESSMENT — PAIN DESCRIPTION - PAIN TYPE
TYPE: ACUTE PAIN

## 2018-02-19 ASSESSMENT — PAIN SCALES - GENERAL
PAINLEVEL_OUTOF10: 6
PAINLEVEL_OUTOF10: 7
PAINLEVEL_OUTOF10: 4
PAINLEVEL_OUTOF10: 6

## 2018-02-19 ASSESSMENT — PAIN DESCRIPTION - LOCATION
LOCATION: CHEST
LOCATION: GENERALIZED;CHEST
LOCATION: CHEST
LOCATION: GENERALIZED
LOCATION: GENERALIZED;CHEST

## 2018-02-19 ASSESSMENT — PAIN DESCRIPTION - DESCRIPTORS
DESCRIPTORS: ACHING
DESCRIPTORS: TIGHTNESS
DESCRIPTORS: ACHING
DESCRIPTORS: ACHING
DESCRIPTORS: TIGHTNESS

## 2018-02-19 ASSESSMENT — PAIN DESCRIPTION - PROGRESSION
CLINICAL_PROGRESSION: GRADUALLY WORSENING
CLINICAL_PROGRESSION: NOT CHANGED

## 2018-02-19 ASSESSMENT — PAIN DESCRIPTION - ORIENTATION
ORIENTATION: LEFT
ORIENTATION: LEFT

## 2018-02-19 ASSESSMENT — ENCOUNTER SYMPTOMS
EYE DISCHARGE: 0
CONSTIPATION: 0
SHORTNESS OF BREATH: 1
RHINORRHEA: 0
NAUSEA: 0
DIARRHEA: 0
SORE THROAT: 1
COLOR CHANGE: 0
EYE REDNESS: 0
BACK PAIN: 0
ABDOMINAL PAIN: 0
COUGH: 1
WHEEZING: 0
VOMITING: 0

## 2018-02-19 ASSESSMENT — PAIN DESCRIPTION - ONSET
ONSET: GRADUAL
ONSET: GRADUAL

## 2018-02-19 NOTE — ED PROVIDER NOTES
Pressure in his mother; Stroke in his mother. SOCIAL HISTORY      reports that he quit smoking about 6 years ago. His smoking use included Cigarettes. He has a 45.00 pack-year smoking history. He has never used smokeless tobacco. He reports that he does not drink alcohol or use drugs. PHYSICAL EXAM     INITIAL VITALS:  height is 5' 8\" (1.727 m) and weight is 160 lb 6.4 oz (72.8 kg). His oral temperature is 98.2 °F (36.8 °C). His blood pressure is 112/71 and his pulse is 85. His respiration is 17 and oxygen saturation is 98%. Physical Exam   Constitutional: He is oriented to person, place, and time. He appears well-developed and well-nourished. No distress. HENT:   Head: Normocephalic and atraumatic. Right Ear: Hearing, tympanic membrane, external ear and ear canal normal.   Left Ear: Hearing, tympanic membrane, external ear and ear canal normal.   Nose: Nose normal. No mucosal edema or rhinorrhea. Mouth/Throat: Oropharynx is clear and moist and mucous membranes are normal. No oral lesions. Eyes: Conjunctivae and EOM are normal. Pupils are equal, round, and reactive to light. Right eye exhibits no discharge. Left eye exhibits no discharge. No scleral icterus. Neck: Normal range of motion. Neck supple. Cardiovascular: Normal rate, regular rhythm and normal heart sounds. Exam reveals no gallop and no friction rub. No murmur heard. There is mild swelling over the left side of the patient's chest over his ICD. Pulmonary/Chest: Effort normal and breath sounds normal. No respiratory distress. He has no decreased breath sounds. He has no wheezes. He has no rhonchi. He has no rales. He exhibits tenderness (anterior chest wall). Abdominal: Soft. Bowel sounds are normal. He exhibits no distension and no mass. There is no tenderness. There is no rebound and no guarding. Musculoskeletal: Normal range of motion. He exhibits no edema. Lymphadenopathy:     He has no cervical adenopathy.

## 2018-02-20 PROBLEM — D69.6 THROMBOCYTOPENIA (HCC): Status: ACTIVE | Noted: 2018-02-20

## 2018-02-20 LAB
ADENOVIRUS F 40 41 PCR: NOT DETECTED
ANION GAP SERPL CALCULATED.3IONS-SCNC: 9 MEQ/L (ref 8–16)
ASTROVIRUS PCR: NOT DETECTED
BANDED NEUTROPHILS ABSOLUTE COUNT: 1 THOU/MM3
BANDS PRESENT: 11 %
BASOPHILS # BLD: 0 %
BASOPHILS ABSOLUTE: 0 THOU/MM3 (ref 0–0.1)
BUN BLDV-MCNC: 24 MG/DL (ref 7–22)
CALCIUM SERPL-MCNC: 7.9 MG/DL (ref 8.5–10.5)
CAMPYLOBACTER PCR: NOT DETECTED
CHLORIDE BLD-SCNC: 107 MEQ/L (ref 98–111)
CHLORIDE, URINE: 54 MEQ/L
CLOSTRIDIUM DIFFICILE, PCR: NOT DETECTED
CO2: 25 MEQ/L (ref 23–33)
CREAT SERPL-MCNC: 1.4 MG/DL (ref 0.4–1.2)
CREATININE URINE: 233.1 MG/DL
CRYPTOSPORIDIUM PCR: NOT DETECTED
CYCLOSPORA CAYETANENSIS PCR: NOT DETECTED
DIFFERENTIAL TYPE: ABNORMAL
DIFFERENTIAL, MANUAL: NORMAL
E COLI 0157 PCR: NORMAL
E COLI ENTEROAGGREGATIVE PCR: NOT DETECTED
E COLI ENTEROPATHOGENIC PCR: NOT DETECTED
E COLI ENTEROTOXIGENIC PCR: NOT DETECTED
E COLI SHIGA LIKE TOXIN PCR: NOT DETECTED
E COLI SHIGELLA/ENTEROINVASIVE PCR: NOT DETECTED
E HISTOLYTICA GI FILM ARRAY: NOT DETECTED
EOSINOPHIL # BLD: 0 %
EOSINOPHILS ABSOLUTE: 0 THOU/MM3 (ref 0–0.4)
GFR SERPL CREATININE-BSD FRML MDRD: 53 ML/MIN/1.73M2
GIARDIA LAMBLIA PCR: NOT DETECTED
GLUCOSE BLD-MCNC: 147 MG/DL (ref 70–108)
HCT VFR BLD CALC: 44.7 % (ref 42–52)
HEMOGLOBIN: 15 GM/DL (ref 14–18)
INR BLD: 1 (ref 0.85–1.13)
LYMPHOCYTES # BLD: 11 %
LYMPHOCYTES ABSOLUTE: 1 THOU/MM3 (ref 1–4.8)
MCH RBC QN AUTO: 30.5 PG (ref 27–31)
MCHC RBC AUTO-ENTMCNC: 33.6 GM/DL (ref 33–37)
MCV RBC AUTO: 90.7 FL (ref 80–94)
MONOCYTES # BLD: 19 %
MONOCYTES ABSOLUTE: 1.7 THOU/MM3 (ref 0.4–1.3)
NOROVIRUS GI GII PCR: NOT DETECTED
NUCLEATED RED BLOOD CELLS: 0 /100 WBC
PATHOLOGIST REVIEW: ABNORMAL
PDW BLD-RTO: 13.7 % (ref 11.5–14.5)
PLATELET # BLD: 99 THOU/MM3 (ref 130–400)
PLATELET ESTIMATE: ABNORMAL
PLESIOMONAS SHIGELLOIDES PCR: NOT DETECTED
PMV BLD AUTO: 12 FL (ref 7.4–10.4)
POTASSIUM REFLEX MAGNESIUM: 4.9 MEQ/L (ref 3.5–5.2)
PROT/CREAT RATIO, UR: 0.31
PROTEIN, URINE: 71.1 MG/DL
RBC # BLD: 4.92 MILL/MM3 (ref 4.7–6.1)
ROTAVIRUS A PCR: NOT DETECTED
SALMONELLA PCR: NOT DETECTED
SAPOVIRUS PCR: NOT DETECTED
SEG NEUTROPHILS: 59 %
SEGMENTED NEUTROPHILS ABSOLUTE COUNT: 5.1 THOU/MM3 (ref 1.8–7.7)
SODIUM BLD-SCNC: 141 MEQ/L (ref 135–145)
SODIUM URINE: 44 MEQ/L
TROPONIN T: < 0.01 NG/ML
TROPONIN T: < 0.01 NG/ML
VIBRIO CHOLERAE PCR: NOT DETECTED
VIBRIO PCR: NOT DETECTED
WBC # BLD: 8.7 THOU/MM3 (ref 4.8–10.8)
YERSINIA ENTEROCOLITICA PCR: NOT DETECTED

## 2018-02-20 PROCEDURE — 82570 ASSAY OF URINE CREATININE: CPT

## 2018-02-20 PROCEDURE — 99254 IP/OBS CNSLTJ NEW/EST MOD 60: CPT | Performed by: INTERNAL MEDICINE

## 2018-02-20 PROCEDURE — 2580000003 HC RX 258: Performed by: FAMILY MEDICINE

## 2018-02-20 PROCEDURE — 6370000000 HC RX 637 (ALT 250 FOR IP): Performed by: FAMILY MEDICINE

## 2018-02-20 PROCEDURE — 85025 COMPLETE CBC W/AUTO DIFF WBC: CPT

## 2018-02-20 PROCEDURE — G0378 HOSPITAL OBSERVATION PER HR: HCPCS

## 2018-02-20 PROCEDURE — 87507 IADNA-DNA/RNA PROBE TQ 12-25: CPT

## 2018-02-20 PROCEDURE — 1200000003 HC TELEMETRY R&B

## 2018-02-20 PROCEDURE — 94640 AIRWAY INHALATION TREATMENT: CPT

## 2018-02-20 PROCEDURE — 99233 SBSQ HOSP IP/OBS HIGH 50: CPT | Performed by: INTERNAL MEDICINE

## 2018-02-20 PROCEDURE — 6370000000 HC RX 637 (ALT 250 FOR IP): Performed by: NURSE PRACTITIONER

## 2018-02-20 PROCEDURE — 36415 COLL VENOUS BLD VENIPUNCTURE: CPT

## 2018-02-20 PROCEDURE — 80048 BASIC METABOLIC PNL TOTAL CA: CPT

## 2018-02-20 PROCEDURE — 85610 PROTHROMBIN TIME: CPT

## 2018-02-20 PROCEDURE — APPSS60 APP SPLIT SHARED TIME 46-60 MINUTES: Performed by: NURSE PRACTITIONER

## 2018-02-20 PROCEDURE — 84156 ASSAY OF PROTEIN URINE: CPT

## 2018-02-20 PROCEDURE — 82436 ASSAY OF URINE CHLORIDE: CPT

## 2018-02-20 PROCEDURE — 2700000000 HC OXYGEN THERAPY PER DAY

## 2018-02-20 PROCEDURE — 84300 ASSAY OF URINE SODIUM: CPT

## 2018-02-20 PROCEDURE — 84484 ASSAY OF TROPONIN QUANT: CPT

## 2018-02-20 RX ORDER — OSELTAMIVIR PHOSPHATE 30 MG/1
30 CAPSULE ORAL 2 TIMES DAILY
Status: DISCONTINUED | OUTPATIENT
Start: 2018-02-20 | End: 2018-02-21

## 2018-02-20 RX ORDER — GABAPENTIN 400 MG/1
1 CAPSULE ORAL 3 TIMES DAILY
Refills: 0 | Status: ON HOLD | COMMUNITY
Start: 2018-02-17 | End: 2018-02-21

## 2018-02-20 RX ORDER — GLIPIZIDE 5 MG/1
5 TABLET ORAL
Status: DISCONTINUED | OUTPATIENT
Start: 2018-02-21 | End: 2018-02-21 | Stop reason: HOSPADM

## 2018-02-20 RX ADMIN — ATORVASTATIN CALCIUM 40 MG: 40 TABLET, FILM COATED ORAL at 01:22

## 2018-02-20 RX ADMIN — ATORVASTATIN CALCIUM 40 MG: 40 TABLET, FILM COATED ORAL at 21:35

## 2018-02-20 RX ADMIN — OSELTAMIVIR PHOSPHATE 30 MG: 30 CAPSULE ORAL at 07:40

## 2018-02-20 RX ADMIN — SODIUM CHLORIDE: 9 INJECTION, SOLUTION INTRAVENOUS at 01:22

## 2018-02-20 RX ADMIN — OSELTAMIVIR PHOSPHATE 30 MG: 30 CAPSULE ORAL at 21:35

## 2018-02-20 RX ADMIN — OSELTAMIVIR PHOSPHATE 30 MG: 30 CAPSULE ORAL at 01:22

## 2018-02-20 RX ADMIN — GLIPIZIDE 5 MG: 5 TABLET ORAL at 07:39

## 2018-02-20 RX ADMIN — Medication 10 ML: at 01:23

## 2018-02-20 RX ADMIN — Medication 2 PUFF: at 10:11

## 2018-02-20 RX ADMIN — ACETYLCYSTEINE 1000 MG: 500 TABLET, EFFERVESCENT ORAL at 12:41

## 2018-02-20 RX ADMIN — ASPIRIN 81 MG: 81 TABLET, CHEWABLE ORAL at 07:42

## 2018-02-20 RX ADMIN — ACETAMINOPHEN 650 MG: 325 TABLET ORAL at 22:08

## 2018-02-20 RX ADMIN — Medication 2 PUFF: at 22:30

## 2018-02-20 RX ADMIN — CLOPIDOGREL 75 MG: 75 TABLET, FILM COATED ORAL at 07:39

## 2018-02-20 RX ADMIN — AMLODIPINE BESYLATE 5 MG: 5 TABLET ORAL at 07:39

## 2018-02-20 ASSESSMENT — PAIN SCALES - GENERAL
PAINLEVEL_OUTOF10: 6
PAINLEVEL_OUTOF10: 0
PAINLEVEL_OUTOF10: 7

## 2018-02-20 ASSESSMENT — PAIN DESCRIPTION - LOCATION: LOCATION: SHOULDER

## 2018-02-20 ASSESSMENT — PAIN DESCRIPTION - ORIENTATION: ORIENTATION: LEFT

## 2018-02-20 ASSESSMENT — PAIN DESCRIPTION - PAIN TYPE: TYPE: ACUTE PAIN

## 2018-02-20 NOTE — PROGRESS NOTES
Prot/Creat Ratio, Ur 0.31    Sodium, urine, random    Collection Time: 02/20/18 11:30 AM   Result Value Ref Range    Sodium, Ur 44 meq/l   Chloride, Random Urine    Collection Time: 02/20/18 11:30 AM   Result Value Ref Range    Chloride, Urine 54.0 meq/l   Troponin    Collection Time: 02/20/18 11:38 AM   Result Value Ref Range    Troponin T < 0.010 ng/ml       Radiology:     Xr Chest Standard (2 Vw)    Result Date: 2/19/2018  PROCEDURE: XR CHEST (2 VW) CLINICAL INFORMATION: Shortness of breath, cough TECHNIQUE: PA and lateral chest radiographs. COMPARISON: PA and lateral chest radiographs 1/12/2018 FINDINGS: A left-sided cardiac device is in stable position. Cardiomediastinal silhouette is within normal limits. Lungs are hyperinflated with flattening of both hemidiaphragms. Stable reticular opacities in the left lower lung are likely secondary to scarring. There are no lung consolidations. Degenerative changes are present in the thoracic spine. Soft tissues are unremarkable. 1. No acute intrathoracic process. 2. Chronic lung disease. **This report has been created using voice recognition software. It may contain minor errors which are inherent in voice recognition technology. ** Final report electronically signed by Dr. Snheal Hong on 2/19/2018 4:39 PM    Ct Chest W Contrast    Result Date: 2/19/2018  PROCEDURE: CT CHEST W CONTRAST CLINICAL INFORMATION: cough, attention possible pneumonia. COMPARISON: Chest x-ray earlier today and the prior chest CTA dated 3/4/2017 TECHNIQUE: 5 mm axial images were obtained through the chest after the administration of intravenous contrast. Sagittal and coronal reconstructions were obtained. All CT scans at this facility use dose modulation, iterative reconstruction, and/or weight-based dosing when appropriate to reduce radiation dose to as low as reasonably achievable. FINDINGS: Lines/tubes/devices:  There is a left subclavian triple lead AICD device, leads in the right atrium, right ventricle, and coronary sinus. Lungs: There is no pneumonia or mass. There are moderate predominantly centrilobular emphysematous changes in the lungs. The trachea and major bronchi are unremarkable. There is bilateral lower lobe atelectasis. There are 2 subcentimeter calcified granulomas in the posterior right middle lobe. Pleura: No pleural effusion. No pneumothorax. Heart: Heart size is normal. There is no pericardial effusion. Carlene and mediastinum: There is no mass or adenopathy. Pulmonary vasculature: Unremarkable Thoracic aorta/vascular: No thoracic aortic aneurysm or dissection. 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 There is probably a severe stenosis of the left subclavian and brachiocephalic veins, new compared to the prior study, as a caliber of the veins appears very small and there are extensive chest wall collaterals. Imaged upper abdomen: There are bilateral adrenal masses measuring 2.3 x 2.2 cm on the left and 1.6 x 1.1 cm on the right which may represent adenomas. These were not imaged on the prior study. Consider correlation with adrenal MRI. Musculoskeletal system: Unremarkable Chest/body wall soft tissues: Unremarkable Thyroid: Unremarkable       No pneumonia. Moderate centrilobular emphysema. Bilateral lower lobe atelectasis. 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 Bilateral adrenal masses as discussed above, consider adrenal MRI correlation. **This report has been created using voice recognition software. It may contain minor errors which are inherent in voice recognition technology. ** Final report electronically signed by Dr. Phoebe Quiroga on 2/19/2018 10:22 PM        EKG: Atrial-sensed ventricular-paced rhythm    Assessment / Plan:    1. Influenza B- Tamiflu  2. CAD (coronary artery disease)- resume ASA, Plavix, Cozaar, Coreg, Lipitor  3.    COPD

## 2018-02-20 NOTE — H&P
History & Physical    Patient:  Melly Lancaster  YOB: 1965  Date of Service: 2/19/2018  MRN: [id number]   Acct:  [de-identified]   Primary Care Physician: Madhav Dickson CNP    Chief Complaint: Chest pain, Shortness of breath    History of Present Illness:   History obtained from chart review and the patient. The patient is a 47 y.o. male with CAD, COPD, CHF, Diabetes, ICD, HTN, CVA, Sleep Apnea who presents to the Emergency Department for the evaluation of chest pain, shortness of breath, and cough. The patient relates to a productive cough but denies hemoptysis, fever, chills, nausea, vomiting, abdominal pain or dysuria. He states that he has been having chest pain that is gradually worsening. The pain is worsened with palpation, breathing, and coughing and patient has localized tenderness over his pacemaker. The patient rates that pain as 6/10 and describes it as a tightness. The pain radiates over left shoulder. The patient states he is on 2 liters of oxygen at home. Patient denies smoking or alcohol use. Influenza screen was positive for Flu B. The patient is being admitted for further evaluation and treatment.       Past Medical History:        Diagnosis Date   [employer] biv icd 4/24/2013    replace inactive diagnosis    CAD (coronary artery disease)     life vest    Cancer ([address]     left arm skin    Cardiac defibrillator in place dec, 2012    [employer]    CHF (congestive heart failure) ([address]     COPD (chronic obstructive pulmonary disease) ([address]     Diabetes mellitus ([address]     diet control    Hypertension     Liver disease     Pacemaker     Medtronic dual pacer    Pneumonia     Sleep apnea     has CPAP    SOB (shortness of breath)     [employer] Bi[employer] ICD 4/24/2013    Unspecified cerebral artery occlusion with cerebral infarction     TIA       Past Surgical History:        Procedure Laterality Date    C[facility]AC [facility]ATION  12/2012    [facility]    OTHER SURGICAL HISTORY loop recorder implant prior to pacemaker in [address] Medications:   No current facility-administered medications on file prior to encounter. Current Outpatient Prescriptions on File Prior to Encounter   Medication Sig Dispense Refill    RA ASPIRIN ADULT LOW STRENGTH 81 MG chewable tablet take 1 tablet by mouth once daily 106 tablet 0    simvastatin (ZOCOR) 20 MG tablet take 1 tablet by mouth NIGHTLY 30 tablet 3    clopidogrel (PLAVIX) 75 MG tablet take 1 tablet by mouth once daily 30 tablet 3    losartan (COZAAR) 25 MG tablet take 1 tablet by mouth once daily 111 tablet 0    cloNIDine (CATAPRES) 0.1 MG tablet take 1 tablet by mouth twice a day if needed for high blood pressure 60 tablet 3    carvedilol (COREG) 25 MG tablet take 1 tablet by mouth twice a day 212 tablet 0    albuterol sulfate HFA (PROAIR HFA) 108 (90 Base) MCG/ACT inhaler Inhale 2 puffs into the lungs every 6 hours as needed for Wheezing 1 Inhaler 3    albuterol (PROVENTIL) (2.5 MG/3ML) 0.083% nebulizer solution Take 3 mLs by nebulization every 6 hours as needed for Wheezing 120 each 3    amLODIPine (NORVASC) 5 MG tablet Take 1 tablet by mouth daily 30 tablet 6    budesonide-formoterol (SYMBICORT) 160-4.5 MCG/ACT AERO Inhale 2 puffs into the lungs 2 times daily 3 Inhaler 3    glipiZIDE (GLUCOTROL) 5 MG tablet Take 5 mg by mouth 2 times daily (before meals)      OXYGEN Inhale into the lungs 2 liters      Respiratory Therapy Supplies (NEBULIZER COMPRESSOR) KIT Inhale  into the lungs 4 times daily. Include all needed supplies  Patient requires aerosol treatments at home to help maintain a stable respiratory status to help minimize emergency room visits and hospital admissions. Expected need over 99 months 1 kit 0       Allergies:  Morphine and Tramadol    Social History:    reports that he quit smoking about 6 years ago.  His smoking use included Cigarettes. He has a 45.00 pack-year smoking history. He has never used smokeless tobacco. He reports that he does not drink alcohol or use drugs. Family History:       Problem Relation Age of Onset    High Blood Pressure Mother     Stroke Mother     Cancer Father     Diabetes Maternal Aunt        Review of systems:  Constitutional: no fever, no night sweats, no fatigue  Head: no headache, no head injury, no migranes. Eye: no blurring of vision, no double vision. Ears: no hearing difficulty, no tinnitus  Mouth/throat: no ulceration, dental caries, dysphagia  Lungs: + cough, + shortness of breath, no wheeze  CVS: no palpitation, + chest pain, + shortness of breath  GI: no abdominal pain, no nausea , no vomiting, no constipation  MINI: no dysuria, frequency and urgency, no hematuria, no kidney stones  Musculoskeletal: no joint pain, swelling , stiffness  Endocrine: no polyuria, polydypsia, no cold or heat intolerence  Hematology: no anemia, no easy brusing or bleeding, no hx of clotting disorder  Dermatology: no skin rash, no eczema, no prurities,  Psychiatry: no depression, no anxiety,no panic attacks, no suicide ideation  Neurology: no syncope, no seizures, no numbness or tingling of hands, no numbness or tingling of feet, no paresis    10 point review of systems completed, all other than noted above are negative. Vitals:   Vitals:    02/19/18 2229   BP: 117/69   Pulse: 88   Resp: 18   Temp:    SpO2: 93%      BMI: Body mass index is 25.09 kg/m².                 Exam:  Physical Examination: General appearance - alert, well appearing, and in no distress  Mental status - alert, oriented to person, place, and time  Neck - supple, no significant adenopathy, no JVD, or carotid bruits  Chest - clear to auscultation, no wheezes, rales or rhonchi, symmetric air entry  Heart - normal rate, regular rhythm, normal S1, S2, no murmurs, rubs, clicks or gallops  Abdomen - soft, nontender, nondistended, no masses or 1.030 (A) 1.002 - 1.03    Blood, Urine NEGATIVE NEGATIVE    pH, UA 5.5 5.0 - 9.0    Protein,  (A) NEGATIVE    Urobilinogen, Urine 0.2 0.0 - 1.0 eu/dl    Nitrite, Urine NEGATIVE NEGATIVE    Leukocyte Esterase, Urine NEGATIVE NEGATIVE    Color, UA DK YELLOW (A) STRAW-YELL    Character, Urine CLOUDY (A) CLEAR-SL C    RBC, UA 3-5 0-2/hpf /hpf    WBC, UA 0-2 0-4/hpf /hpf    Epi Cells 3-5 3-5/hpf /hpf    Mucus, UA THREADS NONE SEEN/    Bacteria, UA NONE FEW/NONE S /hpf    Casts UA >15 HYALINE NONE SEEN /lpf    Crystals NONE SEEN NONE SEEN    Renal Epithelial, Urine NONE SEEN NONE SEEN    Yeast, UA NONE SEEN NONE SEEN    CASTS 2 0-4 C. GRAN NONE SEEN /lpf    MISCELLANEOUS 2 NONE SEEN    Bile Acids, Total    Collection Time: 02/19/18  6:14 PM   Result Value Ref Range    Ictotest NEGATIVE NEGATIVE   Troponin    Collection Time: 02/19/18  7:16 PM   Result Value Ref Range    Troponin T < 0.010 ng/ml       Radiology:     Xr Chest Standard (2 Vw)    Result Date: 2/19/2018  PROCEDURE: XR CHEST (2 VW) CLINICAL INFORMATION: Shortness of breath, cough TECHNIQUE: PA and lateral chest radiographs. COMPARISON: PA and lateral chest radiographs 1/12/2018 FINDINGS: A left-sided cardiac device is in stable position. Cardiomediastinal silhouette is within normal limits. Lungs are hyperinflated with flattening of both hemidiaphragms. Stable reticular opacities in the left lower lung are likely secondary to scarring. There are no lung consolidations. Degenerative changes are present in the thoracic spine. Soft tissues are unremarkable. 1. No acute intrathoracic process. 2. Chronic lung disease. **This report has been created using voice recognition software. It may contain minor errors which are inherent in voice recognition technology. ** Final report electronically signed by Dr. Cassius Bamberger on 2/19/2018 4:39 PM    Ct Chest W Contrast    Result Date: 2/19/2018  PROCEDURE: CT CHEST W CONTRAST CLINICAL INFORMATION: cough, attention possible pneumonia. COMPARISON: Chest x-ray earlier today and the prior chest CTA dated 3/4/2017 TECHNIQUE: 5 mm axial images were obtained through the chest after the administration of intravenous contrast. Sagittal and coronal reconstructions were obtained. All CT scans at this facility use dose modulation, iterative reconstruction, and/or weight-based dosing when appropriate to reduce radiation dose to as low as reasonably achievable. FINDINGS: Lines/tubes/devices: There is a left subclavian triple lead AICD device, leads in the right atrium, right ventricle, and coronary sinus. Lungs: There is no pneumonia or mass. There are moderate predominantly centrilobular emphysematous changes in the lungs. The trachea and major bronchi are unremarkable. There is bilateral lower lobe atelectasis. There are 2 subcentimeter calcified granulomas in the posterior right middle lobe. Pleura: No pleural effusion. No pneumothorax. Heart: Heart size is normal. There is no pericardial effusion. Carlene and mediastinum: There is no mass or adenopathy. Pulmonary vasculature: Unremarkable Thoracic aorta/vascular: No thoracic aortic aneurysm or dissection. 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 There is probably a severe stenosis of the left subclavian and brachiocephalic veins, new compared to the prior study, as a caliber of the veins appears very small and there are extensive chest wall collaterals. Imaged upper abdomen: There are bilateral adrenal masses measuring 2.3 x 2.2 cm on the left and 1.6 x 1.1 cm on the right which may represent adenomas. These were not imaged on the prior study. Consider correlation with adrenal MRI. Musculoskeletal system: Unremarkable Chest/body wall soft tissues: Unremarkable Thyroid: Unremarkable       No pneumonia. Moderate centrilobular emphysema. Bilateral lower lobe atelectasis.   Probable severe left subclavian and

## 2018-02-20 NOTE — ED NOTES
In to reassess pt. Pt repositioned for comfort. Call light in reach. No significant changes. WIll continue to monitor.      Florinda Melendez RN  02/19/18 2045

## 2018-02-20 NOTE — PROGRESS NOTES
Pt admitted to  [phone] from ED and via cart/stretcher. Complaints: Chest pain / discomfort  Shortness of breath. IV none infusing into the upper arm left, condition patent and no redness. IV site free of s/s of infection or infiltration. Vital signs obtained. Assessment and data collection initiated. Oriented to room. Policies and procedures for 6K explained All questions answered with no further questions at this time. Fall prevention and safety brochure discussed with patient. The best day to schedule a follow up Dr appointment is:  Tuesday a.m.

## 2018-02-20 NOTE — CARE COORDINATION
2/20/18, 11:37 AM      Mercedes Armstrong       Admitted from: ER 2/19/2018/ [address] [facility] day: 1   Location: [facility]17/017-A Reason for admit: Influenza B [J10.1] Status: IP   Admit order signed?: yes  PMH:  has a past medical history of [employer] biv icd; CAD (coronary artery disease); Cancer ([facility] Cardiac defibrillator in place; CHF (congestive heart failure) ([facility] COPD (chronic obstructive pulmonary disease) ([facility] Diabetes mellitus ([facility] Hypertension; Liver disease; Other disorders of kidney and ureter in diseases classified elsewhere; Pacemaker; Pneumonia; Sleep apnea; SOB (shortness of breath); [employer] Bi[employer] ICD; and Unspecified cerebral artery occlusion with cerebral infarction. Medications:  Scheduled Meds:   mometasone-formoterol  2 puff Inhalation BID    oseltamivir  30 mg Oral BID    Acetylcysteine  1,000 mg Oral BID    amLODIPine  5 mg Oral Daily    carvedilol  25 mg Oral BID    clopidogrel  75 mg Oral Daily    glipiZIDE  5 mg Oral BID AC    aspirin  81 mg Oral Daily    atorvastatin  40 mg Oral Nightly    sodium chloride flush  10 mL Intravenous 2 times per day     Continuous Infusions:    Pertinent Info/Orders/Treatment Plan:  Creat 1.4, Ca+ 7.9. +flu B.  IVF, acetylcysteine, tamiflu. Nephrology following. Diet: DIET CARB CONTROL;   DVT Prophylaxis: Plavix with SCD's ordered  Smoking status:  reports that he quit smoking about 6 years ago. His smoking use included Cigarettes. He has a 45.00 pack-year smoking history.  He has never used smokeless tobacco.   Influenza Vaccination Screening Completed: yes  Pneumonia Vaccination Screening Completed: yes  Core measures: vte  PCP: Romana Valentine CNP  Readmission:   no  Risk Score: 26.75     Discharge Planning  Current Residence:  Private Residence  Living Arrangements:  Spouse/Significant Other, Children   Support Systems:  Spouse/Significant Other, Children  Current Services PTA:     Potential Assistance Needed:  N/A  Potential Assistance Purchasing Medications:  No  Does patient want to participate in local refill/ meds to beds program?  N/A  Type of Home Care Services:  None  Patient expects to be discharged to:  Home with wife and daughter  Expected Discharge date:  02/21/18  Follow Up Appointment: Best Day/ Time: Tuesday AM    Discharge Plan: Spoke with Yvan Aguilar, he plans return home with his wife at discharge. He uses oxygen ([facility]) at 2L continuously. He denies discharge needs.       Evaluation: no

## 2018-02-20 NOTE — PLAN OF CARE
Problem: Impaired respiratory status  Goal: Clear lung sounds  Outcome: Ongoing  Improve aeration of lungs, home medication.

## 2018-02-20 NOTE — PROGRESS NOTES
Alert oriented x3. Clear appropriate speech. Mucous membranes pink moist. Upper extremities pink, warm, dry. Sensation equal bilaterally. Skin turgor < 3sec. Cap refill <3 sec. Hand grasp strong equal bilaterally. Unlabored respirations on 2 L oxygen nasal cannula. Chest expansion symmetrical. Rhonchi in bilateral lungs. Infrequent productive cough with yellow tinged sputum. Bowel sounds active x4. Abdomen round and nontender. Lower extremities pink warm dry. Pedal push strong equal bilaterally. Pedal pulses 2+ bilaterally. Sensation equal bilaterally. Pt states no numbness/tingling in upper or lower extremities. Pt stated pain at 6 on 1-10 scale. Pain in left shoulder region. Offered ice and repositioning. Pt denied both, able to reposition on own. Patient stated wanting to rest, call light in reach, bed in lowest position. Will continue to monitor.

## 2018-02-20 NOTE — CONSULTS
Impression   1. Bilateral renal cortical thinning but otherwise unremarkable renal ultrasound. 2. Small amount of post void residual urine but otherwise unremarkable urinary bladder. Echocardiogram 5/15/17  Summary   Ejection fraction is visually estimated at 50%. There was mild global hypokinesis of the left ventricle. Mild concentric left ventricular hypertrophy. The aortic valve leaflets were not well visualized. Aortic valve appears tricuspid. Thickened aortic valve leaflets noted. Trivial aortic regurgitation is noted. The mitral valve was not well visualized . Mild mitral regurgitation is present. CXR 2/19/18  Impression:   1. No acute intrathoracic process. 2. Chronic lung disease. CT chest with contrast 2/19/18  FINDINGS:    Lines/tubes/devices: There is a left subclavian triple lead AICD device, leads in the right atrium, right ventricle, and coronary sinus. Lungs: There is no pneumonia or mass. There are moderate predominantly centrilobular emphysematous changes in the lungs. The trachea and major bronchi are unremarkable. There is bilateral lower lobe atelectasis. There are 2 subcentimeter calcified granulomas in the posterior right middle lobe. Pleura: No pleural effusion. No pneumothorax. Heart: Heart size is normal.  There is no pericardial effusion. Carlene and mediastinum: There is no mass or adenopathy. Pulmonary vasculature: Unremarkable    Thoracic aorta/vascular: No thoracic aortic aneurysm or dissection. 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 There is probably a severe stenosis of the left subclavian and brachiocephalic veins, new compared to the prior study, as a caliber of the veins appears very small and there are extensive chest wall collaterals. Imaged upper abdomen:  There are bilateral adrenal masses measuring 2.3 x 2.2 cm on the left and 1.6 x 1.1 cm on the right

## 2018-02-20 NOTE — FLOWSHEET NOTE
Pt was alone at the time of the visit. He was confident that he will be well. He was anointed. 02/20/18 1639   Encounter Summary   Services provided to: Patient   Referral/Consult From: [address] [facility] Family members   Place of [address] [place] [address] Visiting Yes  (2/20)   Complexity of Encounter Moderate   Length of Encounter 30 minutes   Spiritual/Church   Type Spiritual support   Assessment Approachable;Calm; Hopeful   Intervention Active listening;Explored coping resources;Prayer   Outcome Connection/belonging;Expressed gratitude;Engaged in conversation; Shared life review;Expressed feelings/needs/concerns   Sacraments   Sacrament of Sick-Anointing Anointed

## 2018-02-21 ENCOUNTER — TELEPHONE (OUTPATIENT)
Dept: CARDIOLOGY CLINIC | Age: 53
End: 2018-02-21

## 2018-02-21 VITALS
SYSTOLIC BLOOD PRESSURE: 112 MMHG | HEART RATE: 85 BPM | DIASTOLIC BLOOD PRESSURE: 71 MMHG | WEIGHT: 160.4 LBS | OXYGEN SATURATION: 98 % | RESPIRATION RATE: 17 BRPM | BODY MASS INDEX: 24.31 KG/M2 | TEMPERATURE: 98.2 F | HEIGHT: 68 IN

## 2018-02-21 LAB
ANION GAP SERPL CALCULATED.3IONS-SCNC: 8 MEQ/L (ref 8–16)
BUN BLDV-MCNC: 17 MG/DL (ref 7–22)
CALCIUM SERPL-MCNC: 8.6 MG/DL (ref 8.5–10.5)
CHLORIDE BLD-SCNC: 110 MEQ/L (ref 98–111)
CO2: 26 MEQ/L (ref 23–33)
CREAT SERPL-MCNC: 1 MG/DL (ref 0.4–1.2)
GFR SERPL CREATININE-BSD FRML MDRD: 78 ML/MIN/1.73M2
GLUCOSE BLD-MCNC: 133 MG/DL (ref 70–108)
INR BLD: 0.91 (ref 0.85–1.13)
POTASSIUM SERPL-SCNC: 4.6 MEQ/L (ref 3.5–5.2)
SODIUM BLD-SCNC: 144 MEQ/L (ref 135–145)

## 2018-02-21 PROCEDURE — 2580000003 HC RX 258: Performed by: FAMILY MEDICINE

## 2018-02-21 PROCEDURE — 6360000002 HC RX W HCPCS: Performed by: FAMILY MEDICINE

## 2018-02-21 PROCEDURE — 99239 HOSP IP/OBS DSCHRG MGMT >30: CPT | Performed by: INTERNAL MEDICINE

## 2018-02-21 PROCEDURE — 36415 COLL VENOUS BLD VENIPUNCTURE: CPT

## 2018-02-21 PROCEDURE — 94640 AIRWAY INHALATION TREATMENT: CPT

## 2018-02-21 PROCEDURE — G0378 HOSPITAL OBSERVATION PER HR: HCPCS

## 2018-02-21 PROCEDURE — 80048 BASIC METABOLIC PNL TOTAL CA: CPT

## 2018-02-21 PROCEDURE — 85610 PROTHROMBIN TIME: CPT

## 2018-02-21 PROCEDURE — 99232 SBSQ HOSP IP/OBS MODERATE 35: CPT | Performed by: NURSE PRACTITIONER

## 2018-02-21 PROCEDURE — 6370000000 HC RX 637 (ALT 250 FOR IP): Performed by: FAMILY MEDICINE

## 2018-02-21 RX ORDER — OSELTAMIVIR PHOSPHATE 75 MG/1
75 CAPSULE ORAL 2 TIMES DAILY
Status: DISCONTINUED | OUTPATIENT
Start: 2018-02-21 | End: 2018-02-21 | Stop reason: HOSPADM

## 2018-02-21 RX ORDER — CARVEDILOL 6.25 MG/1
TABLET ORAL
Qty: 30 TABLET | Refills: 0 | Status: SHIPPED | OUTPATIENT
Start: 2018-02-21 | End: 2018-05-09 | Stop reason: SDUPTHER

## 2018-02-21 RX ORDER — OSELTAMIVIR PHOSPHATE 75 MG/1
75 CAPSULE ORAL 2 TIMES DAILY
Qty: 8 CAPSULE | Refills: 0 | Status: SHIPPED | OUTPATIENT
Start: 2018-02-21 | End: 2018-02-25

## 2018-02-21 RX ORDER — GABAPENTIN 400 MG/1
400 CAPSULE ORAL DAILY
Qty: 90 CAPSULE | Refills: 0 | Status: SHIPPED | OUTPATIENT
Start: 2018-02-21 | End: 2018-05-22

## 2018-02-21 RX ADMIN — ASPIRIN 81 MG: 81 TABLET, CHEWABLE ORAL at 09:43

## 2018-02-21 RX ADMIN — ACETAMINOPHEN 650 MG: 325 TABLET ORAL at 09:43

## 2018-02-21 RX ADMIN — AMLODIPINE BESYLATE 5 MG: 5 TABLET ORAL at 09:43

## 2018-02-21 RX ADMIN — ALBUTEROL SULFATE 2.5 MG: 2.5 SOLUTION RESPIRATORY (INHALATION) at 09:49

## 2018-02-21 RX ADMIN — OSELTAMIVIR PHOSPHATE 30 MG: 30 CAPSULE ORAL at 09:43

## 2018-02-21 RX ADMIN — Medication 2 PUFF: at 08:42

## 2018-02-21 RX ADMIN — CLOPIDOGREL 75 MG: 75 TABLET, FILM COATED ORAL at 09:43

## 2018-02-21 RX ADMIN — Medication 10 ML: at 09:45

## 2018-02-21 ASSESSMENT — PAIN SCALES - GENERAL
PAINLEVEL_OUTOF10: 6
PAINLEVEL_OUTOF10: 6

## 2018-02-21 ASSESSMENT — PAIN DESCRIPTION - PAIN TYPE: TYPE: ACUTE PAIN

## 2018-02-21 ASSESSMENT — PAIN DESCRIPTION - LOCATION: LOCATION: SHOULDER

## 2018-02-21 NOTE — PROGRESS NOTES
Renal Adjustment Per Protocol  Recent Labs      02/20/18   0519  02/21/18   0555   BUN  24*  17       Recent Labs      02/20/18   0519  02/21/18   0555   CREATININE  1.4*  1.0       Estimated Creatinine Clearance: 83 mL/min (based on SCr of 1 mg/dL). Plan: Change Tamiflu 75 mg BID X 6 more doses to complete 5 days therapy.       Rinku Thomas PharmD 2/21/2018 9:51 AM

## 2018-02-21 NOTE — DISCHARGE SUMMARY
[facility] [address] [phone] - F [phone] [address] [facility] [address]    Phone:  [phone]   · carvedilol 6.25 MG tablet  · gabapentin 400 MG capsule  · oseltamivir 75 MG capsule       Review of systems:  Constitutional: no fever, no night sweats, no fatigue  Head: no headache, no head injury, no migranes. Eye: no blurring of vision, no double vision. Ears: no hearing difficulty, no tinnitus  Mouth/throat: no ulceration, dental caries, dysphagia  Lungs: + cough, + shortness of breath, no wheeze  CVS: no palpitation, + chest pain, + shortness of breath  GI: no abdominal pain, no nausea , no vomiting, no constipation  MINI: no dysuria, frequency and urgency, no hematuria, no kidney stones  Musculoskeletal: no joint pain, swelling , stiffness  Endocrine: no polyuria, polydypsia, no cold or heat intolerence  Hematology: no anemia, no easy brusing or bleeding, no hx of clotting disorder  Dermatology: no skin rash, no eczema, no prurities,  Psychiatry: no depression, no anxiety,no panic attacks, no suicide ideation  Neurology: no syncope, no seizures, no numbness or tingling of hands, no numbness or tingling of feet, no paresis    10 point review of systems completed, all other than noted above are negative. Vitals:   Vitals:    02/21/18 1152   BP: 112/71   Pulse: 85   Resp: 17   Temp: 98.2 °F (36.8 °C)   SpO2: 98%      BMI: Body mass index is 24.39 kg/m².                 Exam:  Physical Examination: General appearance - alert, well appearing, and in no distress  Mental status - alert, oriented to person, place, and time  Neck - supple, no significant adenopathy, no JVD, or carotid bruits  Chest - clear to auscultation, no wheezes, rales or rhonchi, symmetric air entry  Heart - normal rate, regular rhythm, normal S1, S2, no murmurs, rubs, clicks or gallops  Abdomen - soft, nontender, nondistended, no masses or organomegaly  Neurological - alert, oriented, normal speech, no focal findings or movement disorder noted  Musculoskeletal - no joint tenderness, deformity or swelling  Extremities - peripheral pulses normal, no pedal edema, no clubbing or cyanosis  Skin - normal coloration and turgor, no rashes, no suspicious skin lesions noted      Review of Labs and Diagnostic Testing:    Recent Results (from the past 24 hour(s))   Protime-INR    Collection Time: 02/21/18  5:55 AM   Result Value Ref Range    INR 0.91 0.85 - 1.50   Basic Metabolic Panel    Collection Time: 02/21/18  5:55 AM   Result Value Ref Range    Sodium 144 135 - 145 meq/L    Potassium 4.6 3.5 - 5.2 meq/L    Chloride 110 98 - 111 meq/L    CO2 26 23 - 33 meq/L    Glucose 133 (H) 70 - 108 mg/dL    BUN 17 7 - 22 mg/dL    CREATININE 1.0 0.4 - 1.2 mg/dL    Calcium 8.6 8.5 - 10.5 mg/dL   Anion Gap    Collection Time: 02/21/18  5:55 AM   Result Value Ref Range    Anion Gap 8.0 8.0 - 16.0 meq/L   Glomerular Filtration Rate, Estimated    Collection Time: 02/21/18  5:55 AM   Result Value Ref Range    Est, Glom Filt Rate 78 (A) ml/min/1.73m2       Radiology:     Xr Chest Standard (2 Vw)    Result Date: 2/19/2018  PROCEDURE: XR CHEST (2 VW) CLINICAL INFORMATION: Shortness of breath, cough TECHNIQUE: PA and lateral chest radiographs. COMPARISON: PA and lateral chest radiographs 1/12/2018 FINDINGS: A left-sided cardiac device is in stable position. Cardiomediastinal silhouette is within normal limits. Lungs are hyperinflated with flattening of both hemidiaphragms. Stable reticular opacities in the left lower lung are likely secondary to scarring. There are no lung consolidations. Degenerative changes are present in the thoracic spine. Soft tissues are unremarkable. 1. No acute intrathoracic process. 2. Chronic lung disease. **This report has been created using voice recognition software. It may contain minor errors which are inherent in voice recognition technology. ** Final report electronically signed by  Thyroid: Unremarkable       No pneumonia. Moderate centrilobular emphysema. Bilateral lower lobe atelectasis. 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 Bilateral adrenal masses as discussed above, consider adrenal MRI correlation. **This report has been created using voice recognition software. It may contain minor errors which are inherent in voice recognition technology. ** Final report electronically signed by Dr. Salome Koo on 2/19/2018 10:22 PM        EKG: Atrial-sensed ventricular-paced rhythm    Assessment / Plan:    1. Influenza B- Tamiflu- CONTINUE ORAL  2.   CAD (coronary artery disease)- resume ASA, Plavix, Cozaar, Coreg, Lipitor  3. COPD with exacerbation ([facility]- duonebs prn, ABGs, pulse ox to keep 02 > 92%  4. HTN (hypertension)- resume home meds with parameters to hold  5. Chest pain, poss d/t #1, r/o acs- trend troponin, EKG, D Dimer, procal, lactic acid, lipase, CXR  6. Chronic systolic heart failure ([facility]- BNP, monitor I/O's  7. Acute kidney injury superimposed on CKD ([facility]- gentle hydration, monitor BMP,IMPROVED  8. Thrombocytopenia- INR, SCDs for DVT prophylaxis  9.  CHRONIC HYPOXIC RESP FAILURE        Dispo: DISCHARGE PLAN FOR TODAY  STABLE    DISCHARGE TIME:- 28 MINS        DVT prophylaxis: [] Lovenox                                 [x] SCDs                                 [] SQ Heparin                                 [] Encourage ambulation, low risk for DVT, no chemical or mechanical prophylaxis necessary              [] Already on Anticoagulation                Anticipated Disposition upon discharge: [x] Home                                                                         [] Home with Home Health                                                                         [] [facility]                                                                         [] [address] [place] [address] [facility]          Electronically signed by Katerine Barnard MD on 2/21/2018 at 5:52 PM

## 2018-02-21 NOTE — PROGRESS NOTES
Discharge teaching and instructions for diagnosis/procedure of Flu B completed with patient using teachback method. AVS reviewed. Printed prescriptions given to patient. Patient voiced understanding regarding prescriptions, follow up appointments, and care of self at home.  Discharged ambulatory to  home with support per family

## 2018-02-21 NOTE — PROGRESS NOTES
coherant. SKIN: no rash, No significant bruises  MUSCULOSKELETAL: Movement is coordinated. Moves all extremities   EXTREMITIES: Distal lower extremity temp is warm, No lower extremity edema. PSYCHIATRIC: mood and affect appropriate. Medications:   Scheduled Meds:   oseltamivir  75 mg Oral BID    mometasone-formoterol  2 puff Inhalation BID    Acetylcysteine  1,000 mg Oral BID    glipiZIDE  5 mg Oral Dinner    amLODIPine  5 mg Oral Daily    carvedilol  25 mg Oral BID    clopidogrel  75 mg Oral Daily    aspirin  81 mg Oral Daily    atorvastatin  40 mg Oral Nightly    sodium chloride flush  10 mL Intravenous 2 times per day     Continuous Infusions:     Labs:     Recent Labs      02/19/18   1704  02/20/18   0519  02/21/18   0555   NA  142  141  144   K  4.7  4.9  4.6   CL  105  107  110   CO2  24  25  26   BUN  27*  24*  17   CREATININE  1.8*  1.4*  1.0   LABGLOM  40*  53*  78*   GLUCOSE  142*  147*  133*   CALCIUM  8.6  7.9*  8.6     Recent Labs      02/19/18   1704  02/20/18   0520   WBC  7.2  8.7   RBC  5.54  4.92   HGB  16.8  15.0   HCT  50.7  44.7   PLT  100*  99*      02/19/18 CT chest with contrast  Imaged upper abdomen: There are bilateral adrenal masses measuring 2.3 x 2.2 cm on the left and 1.6 x 1.1 cm on the right which may represent adenomas. These were not imaged on the prior study. Consider correlation with adrenal MRI. Impression:       No pneumonia. Moderate centrilobular emphysema. Bilateral lower lobe atelectasis. 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 Bilateral adrenal masses as discussed above, consider adrenal MRI correlation. Echocardiogram 5/15/17   Ejection fraction is visually estimated at 50%. There was mild global hypokinesis of the left ventricle. Mild concentric left ventricular hypertrophy. The aortic valve leaflets were not well visualized. Aortic valve appears tricuspid.    Thickened aortic valve

## 2018-02-21 NOTE — CONSULTS
[address] [place] [address]                                   CONSULTATION    PATIENT NAME: Ramon Gardner                    :        1965  MED REC NO:   [id number]                           ROOM:       0017  ACCOUNT NO:   [de-identified]                           ADMIT DATE: 2018  PROVIDER:     ADAM Luna DATE:  2018    REFERRING PROVIDER:  Gabriel Maria D.O.    REASON FOR CONSULTATION:  Acute kidney injury. HISTORY OF PRESENT ILLNESS:  This is a 58-year-old white male with history  of chronic kidney disease stage III, diabetes, hypertension, history of  congestive heart failure with systolic dysfunction in the past, status post  AICD, COPD, who presents to the hospital with several days' history of  shortness of breath associated with cough, fevers, chills, productive  sputum, associated with diarrhea as well as nausea. The patient also  reports some pain in the left side of his chest.  He reported pain level of  7/10. In the emergency room, the patient was noted to be hypotensive with  blood pressure in systolic 76C. He was noted to have influenza B  positivity. His serum creatinine was 1.8 on arrival.  In the past, his  serum creatinine has been in the range of 1.3 to 1.5 for the most part. Chest x-ray done in the emergency room did not reveal any acute  cardiopulmonary process. He had a CAT scan of the chest with contrast,  which showed emphysema as well as lower lobe atelectasis. He was also  noted to have bilateral adrenal masses. The patient was subsequently  admitted to the [facility] and Nephrology was asked to see him in  consultation for acute kidney injury. Since admission, the patient has  been on normal saline at 100 mL/hr. He has also received Mucomyst.  He is  also on Tamiflu. At home, the patient takes Cozaar, which has been  discontinued for now. patient  did receive IV contrast and his risk for contrast nephropathy is high given  that he is presented with a creatinine of 1.8; however, this will be  monitored. Continue with Mucomyst.  Avoid any nephrotoxins. Do not give  any ACE or ARBs for now. Avoid contrast.  Avoid Fleet phos enema. 2.  History of chronic kidney disease stage III with mild proteinuria,  possibly has some component of diabetic nephropathy. Also has history of  hypertension. 3.  History of congestive heart failure with low EF with subsequent  improvement in his ejection fraction. At this time, the patient is not in  heart failure clinically. We will give him gentle hydration because of  acute kidney injury with careful monitoring. 4. Influenza positivity, on Tamiflu. 5.  Bilateral adrenal masses. We will defer management to the primary  services and to the primary nephrologist; however, this needs further  evaluation. Plan reviewed with the patient, discussed with the nursing  staff, discussed with CNP. Dr. Bre Barroso will take over the care of this  patient in the morning.         Porter Dye M.D.    D: 02/20/2018 19:02:21       T: 02/20/2018 21:27:32     [email or web address]  Job#: [id number]     Doc#: [id number]    CC:

## 2018-02-21 NOTE — PROGRESS NOTES
CLINICAL PHARMACY: DISCHARGE MED RECONCILIATION/REVIEW    [facility]) Select Patient?: No  Total # of Interventions Recommended: 0   -   Total # Interventions Accepted: 0  Intervention Severity:   - Level 1 Intervention Present?: No   - Level 2 #: 0   - Level 3 #: 0   Time Spent (min): 15     Delanna Coho PharmD 2/21/2018 2:32 PM

## 2018-02-23 ASSESSMENT — HEART SCORE: ECG: 1

## 2018-02-23 ASSESSMENT — ENCOUNTER SYMPTOMS
TROUBLE SWALLOWING: 0
SINUS PAIN: 0
SINUS PRESSURE: 0

## 2018-02-25 LAB
BLOOD CULTURE, ROUTINE: NORMAL
BLOOD CULTURE, ROUTINE: NORMAL

## 2018-02-26 ENCOUNTER — TELEPHONE (OUTPATIENT)
Dept: PULMONOLOGY | Age: 53
End: 2018-02-26

## 2018-02-27 ENCOUNTER — TELEPHONE (OUTPATIENT)
Dept: CARDIOLOGY CLINIC | Age: 53
End: 2018-02-27

## 2018-03-11 ENCOUNTER — HOSPITAL ENCOUNTER (EMERGENCY)
Age: 53
Discharge: HOME | End: 2018-03-12
Payer: COMMERCIAL

## 2018-03-11 ENCOUNTER — APPOINTMENT (OUTPATIENT)
Dept: GENERAL RADIOLOGY | Age: 53
End: 2018-03-11
Payer: COMMERCIAL

## 2018-03-11 DIAGNOSIS — J40 BRONCHITIS: Primary | ICD-10-CM

## 2018-03-11 DIAGNOSIS — R07.89 CHEST WALL PAIN: ICD-10-CM

## 2018-03-11 LAB
ALBUMIN SERPL-MCNC: 3.7 G/DL (ref 3.5–5.1)
ALP BLD-CCNC: 67 U/L (ref 38–126)
ALT SERPL-CCNC: 13 U/L (ref 11–66)
ANION GAP SERPL CALCULATED.3IONS-SCNC: 13 MEQ/L (ref 8–16)
AST SERPL-CCNC: 13 U/L (ref 5–40)
BILIRUB SERPL-MCNC: 0.4 MG/DL (ref 0.3–1.2)
BILIRUBIN DIRECT: < 0.2 MG/DL (ref 0–0.3)
BUN BLDV-MCNC: 13 MG/DL (ref 7–22)
CALCIUM SERPL-MCNC: 9.4 MG/DL (ref 8.5–10.5)
CHLORIDE BLD-SCNC: 101 MEQ/L (ref 98–111)
CO2: 30 MEQ/L (ref 23–33)
CREAT SERPL-MCNC: 1.3 MG/DL (ref 0.4–1.2)
EKG ATRIAL RATE: 105 BPM
EKG P AXIS: 82 DEGREES
EKG P-R INTERVAL: 122 MS
EKG Q-T INTERVAL: 408 MS
EKG QRS DURATION: 154 MS
EKG QTC CALCULATION (BAZETT): 539 MS
EKG R AXIS: -112 DEGREES
EKG T AXIS: 81 DEGREES
EKG VENTRICULAR RATE: 105 BPM
GFR SERPL CREATININE-BSD FRML MDRD: 58 ML/MIN/1.73M2
GLUCOSE BLD-MCNC: 102 MG/DL (ref 70–108)
LIPASE: 57.3 U/L (ref 5.6–51.3)
MAGNESIUM: 2 MG/DL (ref 1.6–2.4)
OSMOLALITY CALCULATION: 287.1 MOSMOL/KG (ref 275–300)
POTASSIUM SERPL-SCNC: 4.4 MEQ/L (ref 3.5–5.2)
PRO-BNP: 130.1 PG/ML (ref 0–900)
SODIUM BLD-SCNC: 144 MEQ/L (ref 135–145)
TOTAL PROTEIN: 6.7 G/DL (ref 6.1–8)
TROPONIN T: < 0.01 NG/ML

## 2018-03-11 PROCEDURE — 83735 ASSAY OF MAGNESIUM: CPT

## 2018-03-11 PROCEDURE — 80053 COMPREHEN METABOLIC PANEL: CPT

## 2018-03-11 PROCEDURE — 85025 COMPLETE CBC W/AUTO DIFF WBC: CPT

## 2018-03-11 PROCEDURE — 2580000003 HC RX 258: Performed by: NURSE PRACTITIONER

## 2018-03-11 PROCEDURE — 83690 ASSAY OF LIPASE: CPT

## 2018-03-11 PROCEDURE — 71045 X-RAY EXAM CHEST 1 VIEW: CPT

## 2018-03-11 PROCEDURE — 99285 EMERGENCY DEPT VISIT HI MDM: CPT

## 2018-03-11 PROCEDURE — 82248 BILIRUBIN DIRECT: CPT

## 2018-03-11 PROCEDURE — 84484 ASSAY OF TROPONIN QUANT: CPT

## 2018-03-11 PROCEDURE — 6360000002 HC RX W HCPCS: Performed by: NURSE PRACTITIONER

## 2018-03-11 PROCEDURE — 36415 COLL VENOUS BLD VENIPUNCTURE: CPT

## 2018-03-11 PROCEDURE — 83880 ASSAY OF NATRIURETIC PEPTIDE: CPT

## 2018-03-11 PROCEDURE — 96374 THER/PROPH/DIAG INJ IV PUSH: CPT

## 2018-03-11 PROCEDURE — 93005 ELECTROCARDIOGRAM TRACING: CPT | Performed by: NURSE PRACTITIONER

## 2018-03-11 RX ORDER — 0.9 % SODIUM CHLORIDE 0.9 %
1000 INTRAVENOUS SOLUTION INTRAVENOUS ONCE
Status: COMPLETED | OUTPATIENT
Start: 2018-03-11 | End: 2018-03-12

## 2018-03-11 RX ORDER — METHYLPREDNISOLONE SODIUM SUCCINATE 125 MG/2ML
125 INJECTION, POWDER, LYOPHILIZED, FOR SOLUTION INTRAMUSCULAR; INTRAVENOUS ONCE
Status: COMPLETED | OUTPATIENT
Start: 2018-03-11 | End: 2018-03-11

## 2018-03-11 RX ADMIN — METHYLPREDNISOLONE SODIUM SUCCINATE 125 MG: 125 INJECTION, POWDER, FOR SOLUTION INTRAMUSCULAR; INTRAVENOUS at 23:49

## 2018-03-11 RX ADMIN — SODIUM CHLORIDE 1000 ML: 9 INJECTION, SOLUTION INTRAVENOUS at 23:49

## 2018-03-11 ASSESSMENT — ENCOUNTER SYMPTOMS
NAUSEA: 0
COUGH: 1
EYE REDNESS: 0
BACK PAIN: 0
EYE DISCHARGE: 0
WHEEZING: 0
VOMITING: 0
ABDOMINAL PAIN: 0
SHORTNESS OF BREATH: 0
RHINORRHEA: 0
SORE THROAT: 1
DIARRHEA: 0

## 2018-03-11 ASSESSMENT — PAIN DESCRIPTION - LOCATION
LOCATION: CHEST
LOCATION: CHEST

## 2018-03-11 ASSESSMENT — PAIN DESCRIPTION - PAIN TYPE
TYPE: ACUTE PAIN
TYPE: ACUTE PAIN

## 2018-03-11 ASSESSMENT — PAIN DESCRIPTION - ORIENTATION: ORIENTATION: LEFT

## 2018-03-11 ASSESSMENT — PAIN DESCRIPTION - FREQUENCY
FREQUENCY: CONTINUOUS
FREQUENCY: CONTINUOUS

## 2018-03-11 ASSESSMENT — PAIN DESCRIPTION - ONSET: ONSET: SUDDEN

## 2018-03-11 ASSESSMENT — PAIN DESCRIPTION - PROGRESSION: CLINICAL_PROGRESSION: NOT CHANGED

## 2018-03-11 ASSESSMENT — PAIN DESCRIPTION - DESCRIPTORS
DESCRIPTORS: CONSTANT;PRESSURE
DESCRIPTORS: CONSTANT;PRESSURE;STABBING

## 2018-03-11 ASSESSMENT — PAIN SCALES - GENERAL
PAINLEVEL_OUTOF10: 6
PAINLEVEL_OUTOF10: 6

## 2018-03-12 ENCOUNTER — APPOINTMENT (OUTPATIENT)
Dept: CT IMAGING | Age: 53
End: 2018-03-12
Payer: COMMERCIAL

## 2018-03-12 ENCOUNTER — APPOINTMENT (OUTPATIENT)
Dept: GENERAL RADIOLOGY | Age: 53
End: 2018-03-12
Payer: COMMERCIAL

## 2018-03-12 ENCOUNTER — HOSPITAL ENCOUNTER (EMERGENCY)
Age: 53
Discharge: HOME | End: 2018-03-12
Attending: FAMILY MEDICINE
Payer: COMMERCIAL

## 2018-03-12 VITALS
OXYGEN SATURATION: 98 % | RESPIRATION RATE: 14 BRPM | DIASTOLIC BLOOD PRESSURE: 82 MMHG | HEIGHT: 68 IN | SYSTOLIC BLOOD PRESSURE: 122 MMHG | HEART RATE: 107 BPM | WEIGHT: 160 LBS | BODY MASS INDEX: 24.25 KG/M2 | TEMPERATURE: 98.3 F

## 2018-03-12 VITALS
OXYGEN SATURATION: 97 % | HEIGHT: 68 IN | RESPIRATION RATE: 18 BRPM | SYSTOLIC BLOOD PRESSURE: 87 MMHG | BODY MASS INDEX: 25.46 KG/M2 | TEMPERATURE: 98.1 F | WEIGHT: 168 LBS | HEART RATE: 86 BPM | DIASTOLIC BLOOD PRESSURE: 67 MMHG

## 2018-03-12 DIAGNOSIS — J20.9 ACUTE BRONCHITIS, UNSPECIFIED ORGANISM: Primary | ICD-10-CM

## 2018-03-12 DIAGNOSIS — J44.1 COPD EXACERBATION (HCC): ICD-10-CM

## 2018-03-12 LAB
ANION GAP SERPL CALCULATED.3IONS-SCNC: 16 MEQ/L (ref 8–16)
BASOPHILS # BLD: 0.7 %
BASOPHILS # BLD: 1.3 %
BASOPHILS ABSOLUTE: 0.1 THOU/MM3 (ref 0–0.1)
BASOPHILS ABSOLUTE: 0.2 THOU/MM3 (ref 0–0.1)
BUN BLDV-MCNC: 21 MG/DL (ref 7–22)
CALCIUM SERPL-MCNC: 9.7 MG/DL (ref 8.5–10.5)
CHLORIDE BLD-SCNC: 107 MEQ/L (ref 98–111)
CO2: 25 MEQ/L (ref 23–33)
CREAT SERPL-MCNC: 1.3 MG/DL (ref 0.4–1.2)
D-DIMER QUANTITATIVE: 464 NG/ML FEU (ref 0–500)
EKG ATRIAL RATE: 116 BPM
EKG P AXIS: 79 DEGREES
EKG P-R INTERVAL: 130 MS
EKG Q-T INTERVAL: 368 MS
EKG QRS DURATION: 152 MS
EKG QTC CALCULATION (BAZETT): 511 MS
EKG R AXIS: -117 DEGREES
EKG T AXIS: 79 DEGREES
EKG VENTRICULAR RATE: 116 BPM
EOSINOPHIL # BLD: 0.1 %
EOSINOPHIL # BLD: 3.4 %
EOSINOPHILS ABSOLUTE: 0 THOU/MM3 (ref 0–0.4)
EOSINOPHILS ABSOLUTE: 0.4 THOU/MM3 (ref 0–0.4)
GFR SERPL CREATININE-BSD FRML MDRD: 58 ML/MIN/1.73M2
GLUCOSE BLD-MCNC: 56 MG/DL (ref 70–108)
HCT VFR BLD CALC: 49.2 % (ref 42–52)
HCT VFR BLD CALC: 50.1 % (ref 42–52)
HEMOGLOBIN: 16.3 GM/DL (ref 14–18)
HEMOGLOBIN: 16.6 GM/DL (ref 14–18)
LYMPHOCYTES # BLD: 11.1 %
LYMPHOCYTES # BLD: 4.7 %
LYMPHOCYTES ABSOLUTE: 0.8 THOU/MM3 (ref 1–4.8)
LYMPHOCYTES ABSOLUTE: 1.3 THOU/MM3 (ref 1–4.8)
MCH RBC QN AUTO: 29.7 PG (ref 27–31)
MCH RBC QN AUTO: 29.8 PG (ref 27–31)
MCHC RBC AUTO-ENTMCNC: 33.1 GM/DL (ref 33–37)
MCHC RBC AUTO-ENTMCNC: 33.2 GM/DL (ref 33–37)
MCV RBC AUTO: 89.7 FL (ref 80–94)
MCV RBC AUTO: 89.9 FL (ref 80–94)
MONOCYTES # BLD: 12.1 %
MONOCYTES # BLD: 7.6 %
MONOCYTES ABSOLUTE: 1.2 THOU/MM3 (ref 0.4–1.3)
MONOCYTES ABSOLUTE: 1.5 THOU/MM3 (ref 0.4–1.3)
NUCLEATED RED BLOOD CELLS: 0 /100 WBC
NUCLEATED RED BLOOD CELLS: 0 /100 WBC
OSMOLALITY CALCULATION: 294.9 MOSMOL/KG (ref 275–300)
PDW BLD-RTO: 13.9 % (ref 11.5–14.5)
PDW BLD-RTO: 14 % (ref 11.5–14.5)
PLATELET # BLD: 204 THOU/MM3 (ref 130–400)
PLATELET # BLD: 234 THOU/MM3 (ref 130–400)
PLATELET ESTIMATE: ADEQUATE
PMV BLD AUTO: 12.3 FL (ref 7.4–10.4)
PMV BLD AUTO: 12.3 FL (ref 7.4–10.4)
POTASSIUM SERPL-SCNC: 4.4 MEQ/L (ref 3.5–5.2)
PRO-BNP: 224.7 PG/ML (ref 0–900)
RBC # BLD: 5.48 MILL/MM3 (ref 4.7–6.1)
RBC # BLD: 5.58 MILL/MM3 (ref 4.7–6.1)
SCAN OF BLOOD SMEAR: NORMAL
SCAN OF BLOOD SMEAR: NORMAL
SEG NEUTROPHILS: 72.7 %
SEG NEUTROPHILS: 86.3 %
SEGMENTED NEUTROPHILS ABSOLUTE COUNT: 14.1 THOU/MM3 (ref 1.8–7.7)
SEGMENTED NEUTROPHILS ABSOLUTE COUNT: 8.7 THOU/MM3 (ref 1.8–7.7)
SODIUM BLD-SCNC: 148 MEQ/L (ref 135–145)
TROPONIN T: < 0.01 NG/ML
TROPONIN T: < 0.01 NG/ML
WBC # BLD: 12 THOU/MM3 (ref 4.8–10.8)
WBC # BLD: 16.3 THOU/MM3 (ref 4.8–10.8)

## 2018-03-12 PROCEDURE — 83880 ASSAY OF NATRIURETIC PEPTIDE: CPT

## 2018-03-12 PROCEDURE — 96374 THER/PROPH/DIAG INJ IV PUSH: CPT

## 2018-03-12 PROCEDURE — 6370000000 HC RX 637 (ALT 250 FOR IP): Performed by: FAMILY MEDICINE

## 2018-03-12 PROCEDURE — 99285 EMERGENCY DEPT VISIT HI MDM: CPT

## 2018-03-12 PROCEDURE — 84484 ASSAY OF TROPONIN QUANT: CPT

## 2018-03-12 PROCEDURE — 85379 FIBRIN DEGRADATION QUANT: CPT

## 2018-03-12 PROCEDURE — 6370000000 HC RX 637 (ALT 250 FOR IP): Performed by: NURSE PRACTITIONER

## 2018-03-12 PROCEDURE — 80048 BASIC METABOLIC PNL TOTAL CA: CPT

## 2018-03-12 PROCEDURE — 36415 COLL VENOUS BLD VENIPUNCTURE: CPT

## 2018-03-12 PROCEDURE — 85025 COMPLETE CBC W/AUTO DIFF WBC: CPT

## 2018-03-12 PROCEDURE — 93005 ELECTROCARDIOGRAM TRACING: CPT | Performed by: FAMILY MEDICINE

## 2018-03-12 PROCEDURE — 2580000003 HC RX 258: Performed by: FAMILY MEDICINE

## 2018-03-12 PROCEDURE — 93010 ELECTROCARDIOGRAM REPORT: CPT | Performed by: INTERNAL MEDICINE

## 2018-03-12 PROCEDURE — 6360000002 HC RX W HCPCS: Performed by: FAMILY MEDICINE

## 2018-03-12 PROCEDURE — 94640 AIRWAY INHALATION TREATMENT: CPT

## 2018-03-12 PROCEDURE — 70450 CT HEAD/BRAIN W/O DYE: CPT

## 2018-03-12 PROCEDURE — 71046 X-RAY EXAM CHEST 2 VIEWS: CPT

## 2018-03-12 RX ORDER — PREDNISONE 20 MG/1
20 TABLET ORAL DAILY
Qty: 5 TABLET | Refills: 0 | Status: SHIPPED | OUTPATIENT
Start: 2018-03-12 | End: 2018-03-17

## 2018-03-12 RX ORDER — METHYLPREDNISOLONE SODIUM SUCCINATE 125 MG/2ML
125 INJECTION, POWDER, LYOPHILIZED, FOR SOLUTION INTRAMUSCULAR; INTRAVENOUS ONCE
Status: COMPLETED | OUTPATIENT
Start: 2018-03-12 | End: 2018-03-12

## 2018-03-12 RX ORDER — IPRATROPIUM BROMIDE AND ALBUTEROL SULFATE 2.5; .5 MG/3ML; MG/3ML
1 SOLUTION RESPIRATORY (INHALATION) ONCE
Status: COMPLETED | OUTPATIENT
Start: 2018-03-12 | End: 2018-03-12

## 2018-03-12 RX ORDER — 0.9 % SODIUM CHLORIDE 0.9 %
500 INTRAVENOUS SOLUTION INTRAVENOUS ONCE
Status: COMPLETED | OUTPATIENT
Start: 2018-03-12 | End: 2018-03-12

## 2018-03-12 RX ORDER — DEXTROMETHORPHAN HYDROBROMIDE AND PROMETHAZINE HYDROCHLORIDE 15; 6.25 MG/5ML; MG/5ML
5 SYRUP ORAL 4 TIMES DAILY PRN
Qty: 118 ML | Refills: 0 | Status: SHIPPED | OUTPATIENT
Start: 2018-03-12 | End: 2018-03-19

## 2018-03-12 RX ORDER — GUAIFENESIN/DEXTROMETHORPHAN 100-10MG/5
10 SYRUP ORAL ONCE
Status: COMPLETED | OUTPATIENT
Start: 2018-03-12 | End: 2018-03-12

## 2018-03-12 RX ADMIN — IPRATROPIUM BROMIDE AND ALBUTEROL SULFATE 1 AMPULE: .5; 3 SOLUTION RESPIRATORY (INHALATION) at 21:28

## 2018-03-12 RX ADMIN — GUAIFENESIN AND DEXTROMETHORPHAN 10 ML: 100; 10 SYRUP ORAL at 01:14

## 2018-03-12 RX ADMIN — SODIUM CHLORIDE 500 ML: 9 INJECTION, SOLUTION INTRAVENOUS at 22:00

## 2018-03-12 RX ADMIN — METHYLPREDNISOLONE SODIUM SUCCINATE 125 MG: 125 INJECTION, POWDER, FOR SOLUTION INTRAMUSCULAR; INTRAVENOUS at 23:25

## 2018-03-12 ASSESSMENT — ENCOUNTER SYMPTOMS
COUGH: 1
NAUSEA: 0
ABDOMINAL PAIN: 0
DIARRHEA: 0
SHORTNESS OF BREATH: 1
RHINORRHEA: 0
SORE THROAT: 0
BACK PAIN: 0
EYE DISCHARGE: 0
EYE REDNESS: 0
VOMITING: 0
WHEEZING: 0

## 2018-03-12 ASSESSMENT — HEART SCORE: ECG: 0

## 2018-03-12 ASSESSMENT — PAIN DESCRIPTION - LOCATION: LOCATION: CHEST

## 2018-03-12 ASSESSMENT — PAIN SCALES - GENERAL: PAINLEVEL_OUTOF10: 6

## 2018-03-12 ASSESSMENT — PAIN DESCRIPTION - DESCRIPTORS: DESCRIPTORS: CONSTANT;PRESSURE

## 2018-03-12 ASSESSMENT — PAIN DESCRIPTION - FREQUENCY: FREQUENCY: CONTINUOUS

## 2018-03-12 ASSESSMENT — PAIN DESCRIPTION - PAIN TYPE: TYPE: ACUTE PAIN

## 2018-03-12 NOTE — ED NOTES
In to reassess pt. Pt resting on cot, eyes closed. Respirations easy. No significant changes from previous assessment. Will continue to monitor.      Buster Lange RN  03/12/18 [id number]

## 2018-03-12 NOTE — ED PROVIDER NOTES
RESPIRATORY THERAPY SUPPLIES (NEBULIZER COMPRESSOR) KIT    Inhale  into the lungs 4 times daily. Include all needed supplies  Patient requires aerosol treatments at home to help maintain a stable respiratory status to help minimize emergency room visits and hospital admissions. Expected need over 99 months    SIMVASTATIN (ZOCOR) 20 MG TABLET    take 1 tablet by mouth NIGHTLY    VENTOLIN  (90 BASE) MCG/ACT INHALER    inhale 2 puffs INTO THE LUNGS every 6 hours if needed for WHEEZING       ALLERGIES     is allergic to morphine and tramadol. FAMILY HISTORY     indicated that his mother is . He indicated that his father is . He indicated that all of his three sisters are alive. He indicated that his maternal aunt is . family history includes Cancer in his father; Diabetes in his maternal aunt; High Blood Pressure in his mother; Stroke in his mother. SOCIAL HISTORY      reports that he quit smoking about 6 years ago. His smoking use included Cigarettes. He has a 45.00 pack-year smoking history. He has never used smokeless tobacco. He reports that he does not drink alcohol or use drugs. PHYSICAL EXAM     INITIAL VITALS:  height is 5' 8\" (1.727 m) and weight is 168 lb (76.2 kg). His oral temperature is 98.1 °F (36.7 °C). His blood pressure is 87/67 and his pulse is 86. His respiration is 18 and oxygen saturation is 97%. Physical Exam   Constitutional: He is oriented to person, place, and time. He appears well-developed and well-nourished. HENT:   Head: Normocephalic and atraumatic. Right Ear: External ear normal.   Left Ear: External ear normal.   Mouth/Throat: Posterior oropharyngeal erythema present. Eyes: Conjunctivae are normal. Right eye exhibits no discharge. Left eye exhibits no discharge. No scleral icterus. Neck: Normal range of motion. Neck supple. No JVD present. Cardiovascular: Normal rate, regular rhythm and normal heart sounds.   Exam reveals no gallop

## 2018-03-13 NOTE — ED PROVIDER NOTES
[facility]  eMERGENCY dEPARTMENT eNCOUnter          [address] [facility]       Chief Complaint   Patient presents with    Hypotension    Dizziness    Nasal Congestion    Cough       Nurses Notes reviewed and I agree except as noted in the HPI. HISTORY OF PRESENT ILLNESS    Mercedes Armstrong is a 47 y.o. male who presents to the Emergency Department for the evaluation of shortness of breath and cough. The patient was seen here yesterday for the same thing when he was diagnosed with bronchitis and discharged with steroids and antibiotics. He has a h/o COPD, emphysema, and diabetes. He is on 2 L of oxygen at home. He states he has had his flu shot this year. No further complaints at the time of the initial encounter. The HPI was provided by the patient. REVIEW OF SYSTEMS     Review of Systems   Constitutional: Negative for appetite change, chills, fatigue and fever. HENT: Positive for congestion. Negative for ear pain, rhinorrhea and sore throat. Eyes: Negative for discharge, redness and visual disturbance. Respiratory: Positive for cough and shortness of breath. Negative for wheezing. Cardiovascular: Negative for chest pain, palpitations and leg swelling. Gastrointestinal: Negative for abdominal pain, diarrhea, nausea and vomiting. Genitourinary: Negative for decreased urine volume, difficulty urinating and dysuria. Musculoskeletal: Negative for arthralgias, back pain, joint swelling and neck pain. Skin: Negative for pallor and rash. Allergic/Immunologic: Negative for environmental allergies. Neurological: Negative for dizziness, syncope, weakness, light-headedness and headaches. Hematological: Negative for adenopathy. Psychiatric/Behavioral: Negative for agitation, confusion, dysphoric mood and suicidal ideas. The patient is not nervous/anxious.       PAST MEDICAL HISTORY    has a past medical history of [facility] icd; CAD (coronary artery disease); Cancer ([facility] Cardiac defibrillator in place; CHF (congestive heart failure) ([facility] [address] [facility] COPD (chronic obstructive pulmonary disease) ([facility] [address] [facility] Hypertension; Liver disease; Other disorders of kidney and ureter in diseases classified elsewhere; Pacemaker; Pneumonia; Sleep apnea; SOB (shortness of breath); [place] Bi[facility] ICD; and Unspecified cerebral artery occlusion with cerebral infarction. SURGICAL HISTORY      has a past surgical history that includes Pacemaker insertion; pacemaker placement; skin biopsy; Cardiac catheterization (12/2012); and other surgical history. CURRENT MEDICATIONS       Previous Medications    ALBUTEROL (PROVENTIL) (2.5 MG/3ML) 0.083% NEBULIZER SOLUTION    Take 3 mLs by nebulization every 6 hours as needed for Wheezing    BUDESONIDE-FORMOTEROL (SYMBICORT) 160-4.5 MCG/ACT AERO    Inhale 2 puffs into the lungs 2 times daily    CARVEDILOL (COREG) 6.25 MG TABLET    take 1 tablet by mouth twice a day    CLONIDINE (CATAPRES) 0.1 MG TABLET    take 1 tablet by mouth twice a day if needed for high blood pressure    CLOPIDOGREL (PLAVIX) 75 MG TABLET    take 1 tablet by mouth once daily    GABAPENTIN (NEURONTIN) 400 MG CAPSULE    Take 1 capsule by mouth daily for 90 days. GLIPIZIDE (GLUCOTROL) 5 MG TABLET    Take 5 mg by mouth nightly     OXYGEN    Inhale into the lungs 2 liters    PREDNISONE (DELTASONE) 20 MG TABLET    Take 1 tablet by mouth daily for 5 days    PROMETHAZINE-DEXTROMETHORPHAN (PROMETHAZINE-DM) 6.25-15 MG/5ML SYRUP    Take 5 mLs by mouth 4 times daily as needed for Cough    RA ASPIRIN ADULT LOW STRENGTH 81 MG CHEWABLE TABLET    take 1 tablet by mouth once daily    RESPIRATORY THERAPY SUPPLIES (NEBULIZER COMPRESSOR) KIT    Inhale  into the lungs 4 times daily. Include all needed supplies  Patient requires aerosol treatments at home to help maintain a stable respiratory status to help minimize emergency room visits and hospital admissions.  Expected need over 99 months    SIMVASTATIN (ZOCOR) 20 MG TABLET    take 1 tablet by mouth NIGHTLY    VENTOLIN  (90 BASE) MCG/ACT INHALER    inhale 2 puffs INTO THE LUNGS every 6 hours if needed for WHEEZING       ALLERGIES     is allergic to morphine and tramadol. FAMILY HISTORY     indicated that his mother is . He indicated that his father is . He indicated that all of his three sisters are alive. He indicated that his maternal aunt is . family history includes Cancer in his father; Diabetes in his maternal aunt; High Blood Pressure in his mother; Stroke in his mother. SOCIAL HISTORY      reports that he quit smoking about 6 years ago. His smoking use included Cigarettes. He has a 45.00 pack-year smoking history. He has never used smokeless tobacco. He reports that he does not drink alcohol or use drugs. PHYSICAL EXAM     INITIAL VITALS:  height is 5' 8\" (1.727 m) and weight is 160 lb (72.6 kg). His oral temperature is 98.3 °F (36.8 °C). His blood pressure is 122/82 and his pulse is 107. His respiration is 14 and oxygen saturation is 98%. Physical Exam   Constitutional: He is oriented to person, place, and time. He appears well-developed and well-nourished. HENT:   Head: Normocephalic and atraumatic. Right Ear: External ear normal.   Left Ear: External ear normal.   Eyes: Conjunctivae are normal. Right eye exhibits no discharge. Left eye exhibits no discharge. No scleral icterus. Neck: Normal range of motion. Neck supple. No JVD present. Cardiovascular: Normal rate, regular rhythm and normal heart sounds. Pulses:       Dorsalis pedis pulses are 2+ on the right side, and 2+ on the left side. Pulmonary/Chest: Effort normal and breath sounds normal. No stridor. No respiratory distress. He has no decreased breath sounds. He has no wheezes. He has no rhonchi. He has no rales. Abdominal: Soft. He exhibits no distension. There is no tenderness.    Musculoskeletal: Normal range of

## 2018-03-19 ENCOUNTER — TELEPHONE (OUTPATIENT)
Dept: CARDIOLOGY CLINIC | Age: 53
End: 2018-03-19

## 2018-03-23 ENCOUNTER — TELEPHONE (OUTPATIENT)
Dept: PULMONOLOGY | Age: 53
End: 2018-03-23

## 2018-03-23 NOTE — TELEPHONE ENCOUNTER
Patient was a no show for his appt. Patient does not have a good working phone number. No show letter sent.

## 2018-04-17 ENCOUNTER — TELEPHONE (OUTPATIENT)
Dept: CARDIOLOGY CLINIC | Age: 53
End: 2018-04-17

## 2018-05-09 DIAGNOSIS — I10 ESSENTIAL HYPERTENSION: ICD-10-CM

## 2018-05-09 DIAGNOSIS — I50.22 CHF NYHA CLASS II, CHRONIC, SYSTOLIC (HCC): ICD-10-CM

## 2018-05-09 DIAGNOSIS — I25.10 CORONARY ARTERY DISEASE INVOLVING NATIVE CORONARY ARTERY OF NATIVE HEART WITHOUT ANGINA PECTORIS: ICD-10-CM

## 2018-05-09 RX ORDER — CLOPIDOGREL BISULFATE 75 MG/1
75 TABLET ORAL DAILY
Qty: 30 TABLET | Refills: 0 | Status: SHIPPED | OUTPATIENT
Start: 2018-05-09 | End: 2018-05-10 | Stop reason: SDUPTHER

## 2018-05-09 RX ORDER — CARVEDILOL 6.25 MG/1
TABLET ORAL
Qty: 60 TABLET | Refills: 0 | Status: SHIPPED | OUTPATIENT
Start: 2018-05-09

## 2018-05-09 RX ORDER — CLONIDINE HYDROCHLORIDE 0.1 MG/1
0.1 TABLET ORAL 2 TIMES DAILY PRN
Qty: 60 TABLET | Refills: 0 | Status: SHIPPED | OUTPATIENT
Start: 2018-05-09 | End: 2018-05-10 | Stop reason: SDUPTHER

## 2018-05-09 RX ORDER — SIMVASTATIN 20 MG
20 TABLET ORAL NIGHTLY
Qty: 30 TABLET | Refills: 0 | Status: SHIPPED | OUTPATIENT
Start: 2018-05-09 | End: 2018-05-10 | Stop reason: SDUPTHER

## 2018-05-10 DIAGNOSIS — I10 ESSENTIAL HYPERTENSION: ICD-10-CM

## 2018-05-10 DIAGNOSIS — I25.10 CORONARY ARTERY DISEASE INVOLVING NATIVE CORONARY ARTERY OF NATIVE HEART WITHOUT ANGINA PECTORIS: ICD-10-CM

## 2018-05-10 RX ORDER — SIMVASTATIN 20 MG
20 TABLET ORAL NIGHTLY
Qty: 30 TABLET | Refills: 0 | Status: SHIPPED | OUTPATIENT
Start: 2018-05-10

## 2018-05-10 RX ORDER — CLOPIDOGREL BISULFATE 75 MG/1
75 TABLET ORAL DAILY
Qty: 30 TABLET | Refills: 0 | Status: SHIPPED | OUTPATIENT
Start: 2018-05-10

## 2018-05-10 RX ORDER — SIMVASTATIN 20 MG
20 TABLET ORAL NIGHTLY
Qty: 30 TABLET | Refills: 0 | Status: CANCELLED | OUTPATIENT
Start: 2018-05-10

## 2018-05-10 RX ORDER — CLONIDINE HYDROCHLORIDE 0.1 MG/1
0.1 TABLET ORAL 2 TIMES DAILY PRN
Qty: 60 TABLET | Refills: 0 | Status: SHIPPED | OUTPATIENT
Start: 2018-05-10

## 2018-05-21 ENCOUNTER — TELEPHONE (OUTPATIENT)
Dept: PULMONOLOGY | Age: 53
End: 2018-05-21

## 2018-05-21 RX ORDER — ALBUTEROL SULFATE 90 UG/1
2 AEROSOL, METERED RESPIRATORY (INHALATION) EVERY 6 HOURS PRN
Qty: 1 INHALER | Refills: 3 | Status: SHIPPED | OUTPATIENT
Start: 2018-05-21

## 2019-05-13 ENCOUNTER — TELEPHONE (OUTPATIENT)
Dept: CARDIOLOGY CLINIC | Age: 54
End: 2019-05-13

## 2019-05-13 NOTE — TELEPHONE ENCOUNTER
Received a transmission on device on website   We thought this pt had moved out of state. Unable to leave this pt a message on his phone because his voicemailbox was not set up yet. Spoke to [place].  I will try to contact this pt again

## 2019-05-14 ENCOUNTER — TELEPHONE (OUTPATIENT)
Dept: CARDIOLOGY CLINIC | Age: 54
End: 2019-05-14

## 2019-05-14 NOTE — TELEPHONE ENCOUNTER
Documentation this pt has moved out of state. Phone number is not valid. Unable to contact pt yesterday. His [facility] downloads are still coming to our clinic. Dismissed from [facility], dismissal paperwork sent to address that is listed.

## 2022-07-06 RX ORDER — CARVEDILOL 25 MG/1
TABLET ORAL
COMMUNITY

## 2022-07-06 RX ORDER — CLONIDINE HYDROCHLORIDE 0.1 MG/1
TABLET ORAL
COMMUNITY

## 2022-07-06 RX ORDER — HYDROCODONE BITARTRATE AND ACETAMINOPHEN 5; 325 MG/1; MG/1
TABLET ORAL
COMMUNITY

## 2022-07-06 RX ORDER — ALBUTEROL SULFATE 90 UG/1
AEROSOL, METERED RESPIRATORY (INHALATION)
COMMUNITY

## 2022-07-06 RX ORDER — AMLODIPINE BESYLATE 10 MG/1
TABLET ORAL
COMMUNITY

## 2022-07-06 RX ORDER — METFORMIN HYDROCHLORIDE 500 MG/1
TABLET, EXTENDED RELEASE ORAL
COMMUNITY

## 2022-07-06 RX ORDER — SIMVASTATIN 20 MG
TABLET ORAL
COMMUNITY

## 2022-07-06 RX ORDER — MECLIZINE HYDROCHLORIDE CHEWABLE TABLETS 25 MG/1
TABLET, CHEWABLE ORAL
COMMUNITY

## 2022-07-06 RX ORDER — LOSARTAN POTASSIUM 50 MG/1
TABLET ORAL
COMMUNITY